# Patient Record
Sex: MALE | Race: BLACK OR AFRICAN AMERICAN | NOT HISPANIC OR LATINO | Employment: FULL TIME | URBAN - METROPOLITAN AREA
[De-identification: names, ages, dates, MRNs, and addresses within clinical notes are randomized per-mention and may not be internally consistent; named-entity substitution may affect disease eponyms.]

---

## 2018-04-23 ENCOUNTER — HISTORICAL (OUTPATIENT)
Dept: LAB | Facility: HOSPITAL | Age: 58
End: 2018-04-23

## 2018-04-24 LAB — GRAM STN SPEC: NORMAL

## 2018-04-26 LAB — FINAL CULTURE: NORMAL

## 2023-06-06 ENCOUNTER — HOSPITAL ENCOUNTER (EMERGENCY)
Facility: HOSPITAL | Age: 63
Discharge: HOME | End: 2023-06-07
Attending: EMERGENCY MEDICINE
Payer: COMMERCIAL

## 2023-06-06 VITALS
OXYGEN SATURATION: 98 % | HEART RATE: 94 BPM | BODY MASS INDEX: 35.21 KG/M2 | DIASTOLIC BLOOD PRESSURE: 73 MMHG | SYSTOLIC BLOOD PRESSURE: 125 MMHG | HEIGHT: 72 IN | RESPIRATION RATE: 16 BRPM | WEIGHT: 260 LBS | TEMPERATURE: 98 F

## 2023-06-06 DIAGNOSIS — N28.9 KIDNEY DISEASE: ICD-10-CM

## 2023-06-06 DIAGNOSIS — R53.1 WEAKNESS: Primary | ICD-10-CM

## 2023-06-06 LAB
ALBUMIN SERPL-MCNC: 4.2 G/DL (ref 3.4–4.8)
ALBUMIN/GLOB SERPL: 1.1 RATIO (ref 1.1–2)
ALP SERPL-CCNC: 38 UNIT/L (ref 40–150)
ALT SERPL-CCNC: 22 UNIT/L (ref 0–55)
AST SERPL-CCNC: 23 UNIT/L (ref 5–34)
BASOPHILS # BLD AUTO: 0.04 X10(3)/MCL
BASOPHILS NFR BLD AUTO: 0.7 %
BILIRUBIN DIRECT+TOT PNL SERPL-MCNC: 0.7 MG/DL
BNP BLD-MCNC: <10 PG/ML
BUN SERPL-MCNC: 23.5 MG/DL (ref 8.4–25.7)
CALCIUM SERPL-MCNC: 9.9 MG/DL (ref 8.8–10)
CHLORIDE SERPL-SCNC: 103 MMOL/L (ref 98–107)
CO2 SERPL-SCNC: 26 MMOL/L (ref 23–31)
CREAT SERPL-MCNC: 2.42 MG/DL (ref 0.73–1.18)
EOSINOPHIL # BLD AUTO: 0.17 X10(3)/MCL (ref 0–0.9)
EOSINOPHIL NFR BLD AUTO: 3.1 %
ERYTHROCYTE [DISTWIDTH] IN BLOOD BY AUTOMATED COUNT: 14.2 % (ref 11.5–17)
FLUAV AG UPPER RESP QL IA.RAPID: NOT DETECTED
FLUBV AG UPPER RESP QL IA.RAPID: NOT DETECTED
GFR SERPLBLD CREATININE-BSD FMLA CKD-EPI: 30 MLS/MIN/1.73/M2
GLOBULIN SER-MCNC: 3.8 GM/DL (ref 2.4–3.5)
GLUCOSE SERPL-MCNC: 140 MG/DL (ref 82–115)
HCT VFR BLD AUTO: 42.5 % (ref 42–52)
HGB BLD-MCNC: 13.8 G/DL (ref 14–18)
IMM GRANULOCYTES # BLD AUTO: 0.01 X10(3)/MCL (ref 0–0.04)
IMM GRANULOCYTES NFR BLD AUTO: 0.2 %
LYMPHOCYTES # BLD AUTO: 2.08 X10(3)/MCL (ref 0.6–4.6)
LYMPHOCYTES NFR BLD AUTO: 37.7 %
MCH RBC QN AUTO: 29.2 PG (ref 27–31)
MCHC RBC AUTO-ENTMCNC: 32.5 G/DL (ref 33–36)
MCV RBC AUTO: 90 FL (ref 80–94)
MONOCYTES # BLD AUTO: 0.52 X10(3)/MCL (ref 0.1–1.3)
MONOCYTES NFR BLD AUTO: 9.4 %
NEUTROPHILS # BLD AUTO: 2.7 X10(3)/MCL (ref 2.1–9.2)
NEUTROPHILS NFR BLD AUTO: 48.9 %
NRBC BLD AUTO-RTO: 0 %
PLATELET # BLD AUTO: 207 X10(3)/MCL (ref 130–400)
PMV BLD AUTO: 9.2 FL (ref 7.4–10.4)
POTASSIUM SERPL-SCNC: 3.9 MMOL/L (ref 3.5–5.1)
PROT SERPL-MCNC: 8 GM/DL (ref 5.8–7.6)
RBC # BLD AUTO: 4.72 X10(6)/MCL (ref 4.7–6.1)
SARS-COV-2 RNA RESP QL NAA+PROBE: NOT DETECTED
SODIUM SERPL-SCNC: 142 MMOL/L (ref 136–145)
TROPONIN I SERPL-MCNC: <0.01 NG/ML (ref 0–0.04)
TSH SERPL-ACNC: 5.11 UIU/ML (ref 0.35–4.94)
WBC # SPEC AUTO: 5.52 X10(3)/MCL (ref 4.5–11.5)

## 2023-06-06 PROCEDURE — 83880 ASSAY OF NATRIURETIC PEPTIDE: CPT | Performed by: EMERGENCY MEDICINE

## 2023-06-06 PROCEDURE — 80053 COMPREHEN METABOLIC PANEL: CPT | Performed by: EMERGENCY MEDICINE

## 2023-06-06 PROCEDURE — 85025 COMPLETE CBC W/AUTO DIFF WBC: CPT | Performed by: EMERGENCY MEDICINE

## 2023-06-06 PROCEDURE — 0240U COVID/FLU A&B PCR: CPT | Performed by: EMERGENCY MEDICINE

## 2023-06-06 PROCEDURE — 96360 HYDRATION IV INFUSION INIT: CPT

## 2023-06-06 PROCEDURE — 25000003 PHARM REV CODE 250: Performed by: EMERGENCY MEDICINE

## 2023-06-06 PROCEDURE — 84443 ASSAY THYROID STIM HORMONE: CPT | Performed by: EMERGENCY MEDICINE

## 2023-06-06 PROCEDURE — 84484 ASSAY OF TROPONIN QUANT: CPT | Performed by: EMERGENCY MEDICINE

## 2023-06-06 PROCEDURE — 99284 EMERGENCY DEPT VISIT MOD MDM: CPT | Mod: 25

## 2023-06-06 RX ORDER — SODIUM CHLORIDE 9 MG/ML
1000 INJECTION, SOLUTION INTRAVENOUS
Status: COMPLETED | OUTPATIENT
Start: 2023-06-06 | End: 2023-06-06

## 2023-06-06 RX ADMIN — SODIUM CHLORIDE 1000 ML: 9 INJECTION, SOLUTION INTRAVENOUS at 10:06

## 2023-06-07 LAB
APPEARANCE UR: CLEAR
BACTERIA #/AREA URNS AUTO: ABNORMAL /HPF
BILIRUB UR QL STRIP.AUTO: NEGATIVE MG/DL
COLOR UR: YELLOW
GLUCOSE UR QL STRIP.AUTO: >=1000 MG/DL
KETONES UR QL STRIP.AUTO: NEGATIVE MG/DL
LEUKOCYTE ESTERASE UR QL STRIP.AUTO: NEGATIVE UNIT/L
MUCOUS THREADS URNS QL MICRO: ABNORMAL /LPF
NITRITE UR QL STRIP.AUTO: NEGATIVE
PH UR STRIP.AUTO: 5.5 [PH]
PROT UR QL STRIP.AUTO: 30 MG/DL
RBC #/AREA URNS AUTO: ABNORMAL /HPF
RBC UR QL AUTO: ABNORMAL UNIT/L
SP GR UR STRIP.AUTO: 1.02
SQUAMOUS #/AREA URNS AUTO: ABNORMAL /HPF
UROBILINOGEN UR STRIP-ACNC: 0.2 MG/DL
WBC #/AREA URNS AUTO: ABNORMAL /HPF

## 2023-06-07 PROCEDURE — 81001 URINALYSIS AUTO W/SCOPE: CPT | Performed by: EMERGENCY MEDICINE

## 2023-06-07 NOTE — ED PROVIDER NOTES
Encounter Date: 6/6/2023       History     Chief Complaint   Patient presents with    Weakness    Shortness of Breath    Neck Pain     62-year-old male with a past medical history of diabetes and hypertension complains of generalized weakness for the last 2 days.  He feels a little short of breath in his having pain in his upper shoulders and neck.  He states that he is a , is not in the heat, has had no recent medication changes. However he does not think he is drinking enough fluids He has no chest pain or abdominal pain.      Review of patient's allergies indicates:  No Known Allergies  Past Medical History:   Diagnosis Date    Diabetes mellitus     Hypertension      History reviewed. No pertinent surgical history.  No family history on file.     Review of Systems   Musculoskeletal:  Positive for arthralgias and neck pain.   Neurological:  Positive for weakness.   All other systems reviewed and are negative.    Physical Exam     Initial Vitals [06/06/23 2228]   BP Pulse Resp Temp SpO2   (!) 87/56 94 18 98.1 °F (36.7 °C) 98 %      MAP       --         Physical Exam    Nursing note and vitals reviewed.  Constitutional: Vital signs are normal. He appears well-developed and well-nourished.   HENT:   Head: Normocephalic and atraumatic.   Mouth/Throat: Oropharynx is clear and moist.   Eyes: Pupils are equal, round, and reactive to light.   Neck: Neck supple.   Normal range of motion.  Cardiovascular:  Normal rate, regular rhythm and normal heart sounds.           Pulmonary/Chest: Breath sounds normal. No respiratory distress.   Abdominal: Abdomen is soft. There is no abdominal tenderness.   Musculoskeletal:         General: No tenderness or edema.      Cervical back: Normal range of motion and neck supple. No edema or erythema.     Lymphadenopathy:     He has no cervical adenopathy.   Neurological: He is alert and oriented to person, place, and time. GCS score is 15. GCS eye subscore is 4. GCS verbal  subscore is 5. GCS motor subscore is 6.   Skin: Skin is warm and dry. Capillary refill takes less than 2 seconds.       ED Course   Procedures  Labs Reviewed   CBC WITH DIFFERENTIAL - Abnormal; Notable for the following components:       Result Value    Hgb 13.8 (*)     MCHC 32.5 (*)     All other components within normal limits   COMPREHENSIVE METABOLIC PANEL - Abnormal; Notable for the following components:    Glucose Level 140 (*)     Creatinine 2.42 (*)     Protein Total 8.0 (*)     Globulin 3.8 (*)     Alkaline Phosphatase 38 (*)     All other components within normal limits   URINALYSIS, REFLEX TO URINE CULTURE - Abnormal; Notable for the following components:    Protein, UA 30 (*)     Glucose, UA >=1000 (*)     Blood, UA Trace (*)     All other components within normal limits   TSH - Abnormal; Notable for the following components:    Thyroid Stimulating Hormone 5.105 (*)     All other components within normal limits   URINALYSIS, MICROSCOPIC - Abnormal; Notable for the following components:    Mucous, UA Small (*)     Squamous Epithelial Cells, UA Few (*)     All other components within normal limits   B-TYPE NATRIURETIC PEPTIDE - Normal   TROPONIN I - Normal   COVID/FLU A&B PCR - Normal    Narrative:     The Xpert Xpress SARS-CoV-2/FLU/RSV plus is a rapid, multiplexed real-time PCR test intended for the simultaneous qualitative detection and differentiation of SARS-CoV-2, Influenza A, Influenza B, and respiratory syncytial virus (RSV) viral RNA in either nasopharyngeal swab or nasal swab specimens.                Imaging Results              X-Ray Chest 1 View (In process)                   X-Rays:   Independently Interpreted Readings:   Other Readings:  Chest Xray is negative by my preliminary reading  Medications   0.9%  NaCl infusion (0 mLs Intravenous Stopped 6/6/23 1471)     Medical Decision Making:   Initial Assessment:   62-year-old male with a past medical history of diabetes and hypertension  complains of generalized weakness for the last 2 days.  He feels a little short of breath in his having pain in his upper shoulders and neck.  He states that he is a , is not in the heat, has had no recent medication changes.  He has no chest pain or abdominal pain.      Differential Diagnosis:   Differential diagnosis includes but is not limited to dehydration, renal failure, electrolyte abnormality, infectious process  Clinical Tests:   Lab Tests: Reviewed  Radiological Study: Reviewed  ED Management:  Patient was seen evaluated in the emergency department with history physical exam, lab work.  Chest x-ray and EKG was performed.  He was given IV fluids.  His blood pressure increased in at discharge he is feeling well.  Workup is benign other than elevated creatinine level which he will discuss with his doctor next week.  He also has mildly increased TSH.  He is stable for discharge and questions were answered.           ED Course as of 06/07/23 0118   Wed Jun 07, 2023   0030 Heart rate jumped to 122 in the alarm started alarming.  I went in the room and the patient was jogging in place, laughing, and states he feels well in his asking to go home.  UA is pending.  He was given IV fluids.  Elevated Creatinine was discussed [SH]   0046 Creatinine(!): 2.42 [SH]   0047 CKD vs dehydration [SH]   0047 Thyroid Stimulating Hormone(!): 5.105 [SH]   0103 I recommend that he drink lots of fluids and keep his appointment with his doctor next week.  He may need to have his lisinopril discontinued but at this time I recommend he increase his fluid intake and his doctor can evaluate his home medications next week. [SH]      ED Course User Index  [SH] Meli Arzate MD                 Clinical Impression:   Final diagnoses:  [R53.1] Weakness (Primary)  [N28.9] Kidney disease        ED Disposition Condition    Discharge Stable          ED Prescriptions    None       Follow-up Information       Follow up With  Specialties Details Why Contact Info    PCP  Schedule an appointment as soon as possible for a visit                Meli Arzate MD  06/07/23 0118

## 2023-07-30 ENCOUNTER — HOSPITAL ENCOUNTER (EMERGENCY)
Facility: HOSPITAL | Age: 63
Discharge: HOME | End: 2023-07-30
Attending: INTERNAL MEDICINE
Payer: COMMERCIAL

## 2023-07-30 VITALS
TEMPERATURE: 99 F | RESPIRATION RATE: 16 BRPM | DIASTOLIC BLOOD PRESSURE: 72 MMHG | OXYGEN SATURATION: 98 % | HEART RATE: 84 BPM | SYSTOLIC BLOOD PRESSURE: 158 MMHG

## 2023-07-30 DIAGNOSIS — S81.012A KNEE LACERATION, LEFT, INITIAL ENCOUNTER: Primary | ICD-10-CM

## 2023-07-30 DIAGNOSIS — T14.90XA TRAUMA: ICD-10-CM

## 2023-07-30 PROCEDURE — 25000003 PHARM REV CODE 250: Performed by: NURSE PRACTITIONER

## 2023-07-30 PROCEDURE — 90715 TDAP VACCINE 7 YRS/> IM: CPT | Performed by: INTERNAL MEDICINE

## 2023-07-30 PROCEDURE — 90471 IMMUNIZATION ADMIN: CPT | Performed by: INTERNAL MEDICINE

## 2023-07-30 PROCEDURE — 96372 THER/PROPH/DIAG INJ SC/IM: CPT | Performed by: INTERNAL MEDICINE

## 2023-07-30 PROCEDURE — 25000003 PHARM REV CODE 250: Performed by: INTERNAL MEDICINE

## 2023-07-30 PROCEDURE — 12034 INTMD RPR S/TR/EXT 7.6-12.5: CPT

## 2023-07-30 PROCEDURE — 63600175 PHARM REV CODE 636 W HCPCS: Performed by: INTERNAL MEDICINE

## 2023-07-30 PROCEDURE — 99284 EMERGENCY DEPT VISIT MOD MDM: CPT

## 2023-07-30 RX ORDER — LIDOCAINE HYDROCHLORIDE 10 MG/ML
20 INJECTION INFILTRATION; PERINEURAL
Status: COMPLETED | OUTPATIENT
Start: 2023-07-30 | End: 2023-07-30

## 2023-07-30 RX ORDER — CEFAZOLIN SODIUM 1 G/3ML
1 INJECTION, POWDER, FOR SOLUTION INTRAMUSCULAR; INTRAVENOUS
Status: COMPLETED | OUTPATIENT
Start: 2023-07-30 | End: 2023-07-30

## 2023-07-30 RX ORDER — DICLOFENAC SODIUM 75 MG/1
75 TABLET, DELAYED RELEASE ORAL 2 TIMES DAILY
Qty: 14 TABLET | Refills: 0 | Status: SHIPPED | OUTPATIENT
Start: 2023-07-30 | End: 2023-08-06

## 2023-07-30 RX ORDER — HYDROCODONE BITARTRATE AND ACETAMINOPHEN 5; 325 MG/1; MG/1
1 TABLET ORAL EVERY 8 HOURS PRN
Qty: 15 TABLET | Refills: 0 | Status: SHIPPED | OUTPATIENT
Start: 2023-07-30 | End: 2023-08-04

## 2023-07-30 RX ORDER — AMOXICILLIN AND CLAVULANATE POTASSIUM 875; 125 MG/1; MG/1
1 TABLET, FILM COATED ORAL 2 TIMES DAILY
Qty: 14 TABLET | Refills: 0 | Status: SHIPPED | OUTPATIENT
Start: 2023-07-30

## 2023-07-30 RX ADMIN — BACITRACIN ZINC, NEOMYCIN, POLYMYXIN B 1 EACH: 400; 3.5; 5 OINTMENT TOPICAL at 08:07

## 2023-07-30 RX ADMIN — LIDOCAINE HYDROCHLORIDE 20 ML: 10 INJECTION, SOLUTION INFILTRATION; PERINEURAL at 07:07

## 2023-07-30 RX ADMIN — TETANUS TOXOID, REDUCED DIPHTHERIA TOXOID AND ACELLULAR PERTUSSIS VACCINE, ADSORBED 0.5 ML: 5; 2.5; 8; 8; 2.5 SUSPENSION INTRAMUSCULAR at 07:07

## 2023-07-30 RX ADMIN — CEFAZOLIN 1 G: 330 INJECTION, POWDER, FOR SOLUTION INTRAMUSCULAR; INTRAVENOUS at 07:07

## 2023-07-30 NOTE — ED PROVIDER NOTES
Encounter Date: 7/30/2023       History     Chief Complaint   Patient presents with    Laceration     Pt presents with laceration to his left knee after a chainsaw accident while cutting up a tree. Bleeding controlled at this time.      Presents with a laceration to his left knee prior to arrival. States he was cutting a tree with a chain saw and hit his leg. Hx of DM and HTN, unsure of last tetanus immunization. No active bleeding. Pt reports finishing another hour of cutting prior to coming to the ED. Denies coolness to affected extremity or loss of sensation to extremity.     The history is provided by the patient.     Review of patient's allergies indicates:  No Known Allergies  Past Medical History:   Diagnosis Date    Diabetes mellitus     Hypertension      History reviewed. No pertinent surgical history.  History reviewed. No pertinent family history.     Review of Systems   Constitutional:  Negative for chills, diaphoresis, fatigue and fever.   HENT:  Negative for facial swelling, postnasal drip, rhinorrhea, sinus pressure, sinus pain, sore throat and trouble swallowing.    Respiratory:  Negative for cough, chest tightness, shortness of breath and wheezing.    Cardiovascular:  Negative for chest pain, palpitations and leg swelling.   Gastrointestinal:  Negative for abdominal pain, diarrhea, nausea and vomiting.   Genitourinary:  Negative for dysuria, flank pain, hematuria and urgency.   Musculoskeletal:  Negative for arthralgias, back pain and myalgias.   Skin:  Positive for wound. Negative for color change and rash.   Neurological:  Negative for dizziness, syncope, weakness and headaches.   Hematological:  Does not bruise/bleed easily.   All other systems reviewed and are negative.      Physical Exam     Initial Vitals [07/30/23 1843]   BP Pulse Resp Temp SpO2   (!) 177/75 100 18 98.4 °F (36.9 °C) 100 %      MAP       --         Physical Exam    Nursing note and vitals reviewed.  Constitutional: He appears  well-developed and well-nourished. No distress.   HENT:   Head: Normocephalic and atraumatic.   Eyes: Conjunctivae are normal. Pupils are equal, round, and reactive to light.   Neck: No JVD present.   Normal range of motion.  Cardiovascular:  Normal rate, regular rhythm, normal heart sounds and intact distal pulses.           Pulmonary/Chest: Breath sounds normal. No respiratory distress.   Musculoskeletal:         General: No edema. Normal range of motion.      Cervical back: Normal range of motion.      Comments: 8 cm laceration to the superior aspect of the left knee with associated secondary superficial lacerations. N-V intact     Neurological: He is alert and oriented to person, place, and time. He has normal strength. GCS score is 15. GCS eye subscore is 4. GCS verbal subscore is 5. GCS motor subscore is 6.   Skin: Skin is warm. No rash noted.   8 cm laceration to the superior aspect of the left knee with associated abrasions. N-V intact   Psychiatric: His behavior is normal.         ED Course   Lac Repair    Date/Time: 7/30/2023 8:49 PM    Performed by: ANNE MARIE Hartley Jr.  Authorized by: Sammy Blount MD    Consent:     Consent obtained:  Emergent situation    Consent given by:  Patient    Risks, benefits, and alternatives were discussed: yes      Risks discussed:  Pain, poor cosmetic result and poor wound healing    Alternatives discussed:  No treatment  Universal protocol:     Procedure explained and questions answered to patient or proxy's satisfaction: yes      Imaging studies available: yes      Immediately prior to procedure, a time out was called: yes      Patient identity confirmed:  Verbally with patient, arm band and provided demographic data  Anesthesia:     Anesthesia method:  Local infiltration    Local anesthetic:  Lidocaine 1% w/o epi  Laceration details:     Location:  Leg    Leg location:  L knee    Length (cm):  8 (multiple superficial lacerations that deepened during  suture application)  Pre-procedure details:     Preparation:  Patient was prepped and draped in usual sterile fashion and imaging obtained to evaluate for foreign bodies  Exploration:     Imaging obtained: x-ray      Imaging outcome: foreign body not noted      Wound exploration: entire depth of wound visualized      Contaminated: yes    Treatment:     Area cleansed with:  Povidone-iodine and saline    Amount of cleaning:  Extensive    Irrigation solution:  Sterile saline    Irrigation volume:  500 ml    Irrigation method:  Pressure wash    Layers/structures repaired:  Deep subcutaneous  Deep subcutaneous:     Suture size:  3-0    Suture material:  Vicryl    Suture technique:  Horizontal mattress    Number of sutures:  4  Skin repair:     Repair method:  Sutures    Suture size:  3-0    Suture material:  Nylon    Suture technique:  Simple interrupted and figure eight    Number of sutures:  35  Approximation:     Approximation:  Close  Repair type:     Repair type:  Simple  Post-procedure details:     Dressing:  Antibiotic ointment and non-adherent dressing    Procedure completion:  Tolerated    Labs Reviewed - No data to display       Imaging Results              X-Ray Knee 1 or 2 View Left (Preliminary result)  Result time 07/30/23 20:53:35   Procedure changed from X-Ray Knee 3 View Left     Wet Read by Franklin Childs Jr., THORP (07/30/23 20:53:35, [facility], Emergency Medicine)    Degenerative changes. No acute fracture noted.                                     Medications   LIDOcaine HCL 10 mg/ml (1%) injection 20 mL (20 mLs Infiltration Given 7/30/23 1910)   Tdap (BOOSTRIX) vaccine injection 0.5 mL (0.5 mLs Intramuscular Given 7/30/23 1907)   ceFAZolin injection 1 g (1 g Intramuscular Given 7/30/23 1907)   neomycin-bacitracnZn-polymyxnB packet (1 each Topical (Top) Given 7/30/23 2055)     Medical Decision Making:   Differential Diagnosis:   Laceration, fracture, among others  Clinical  Tests:   Radiological Study: Reviewed and Ordered  ED Management:  8:53 PM Reassessed patient at this time. Reports condition has improved. Discussed with patient all pertinent ED information and results. Discussed diagnosis and treatment plan with patient. Follow up instructions and return to ED instruction have been given. All questions and concerns were addressed at this time. Patient voices understanding of information and instructions. Patient is comfortable with plan and discharge. Patient is stable for discharge.                             Clinical Impression:   Final diagnoses:  [T14.90XA] Trauma  [S81.012A] Knee laceration, left, initial encounter (Primary)        ED Disposition Condition    Discharge Stable          ED Prescriptions       Medication Sig Dispense Start Date End Date Auth. Provider    amoxicillin-clavulanate 875-125mg (AUGMENTIN) 875-125 mg per tablet Take 1 tablet by mouth 2 (two) times daily. 14 tablet 7/30/2023 -- ANNE MARIE Hartley Jr.    diclofenac (VOLTAREN) 75 MG EC tablet Take 1 tablet (75 mg total) by mouth 2 (two) times daily. for 7 days 14 tablet 7/30/2023 8/6/2023 ANNE MARIE Hartley Jr.    HYDROcodone-acetaminophen (NORCO) 5-325 mg per tablet Take 1 tablet by mouth every 8 (eight) hours as needed for Pain. 15 tablet 7/30/2023 8/4/2023 ANNE MARIE Hartley Jr.          Follow-up Information       Follow up With Specialties Details Why Contact Info    [facility] Emergency Medicine In 3 days As needed, If symptoms worsen [address] [facility] [address] [phone]             ANNE MARIE Hartley Jr.  07/30/23 9079

## 2023-07-30 NOTE — Clinical Note
"Ricardo"Duglas Castro was seen and treated in our emergency department on 7/30/2023.  He may return to work on 08/02/2023.       If you have any questions or concerns, please don't hesitate to call.      Franklin Childs Jr., FNP"

## 2023-07-31 NOTE — DISCHARGE INSTRUCTIONS
Clean wound twice daily and apply neosporin.   Return to the [facility] ED immediately for worsening redness, purulent drainage from wound, or fever.   Follow up with your primary care physician in 3-5 days for follow up evaluation.  Take medication as prescribed.  Return to the [facility] ED in 10-14 days for suture removal.

## 2023-08-08 ENCOUNTER — HOSPITAL ENCOUNTER (EMERGENCY)
Facility: HOSPITAL | Age: 63
Discharge: HOME | End: 2023-08-08
Attending: INTERNAL MEDICINE
Payer: COMMERCIAL

## 2023-08-08 VITALS
WEIGHT: 257.94 LBS | BODY MASS INDEX: 36.11 KG/M2 | HEART RATE: 98 BPM | HEIGHT: 71 IN | DIASTOLIC BLOOD PRESSURE: 64 MMHG | TEMPERATURE: 98 F | OXYGEN SATURATION: 100 % | SYSTOLIC BLOOD PRESSURE: 152 MMHG | RESPIRATION RATE: 16 BRPM

## 2023-08-08 DIAGNOSIS — Z48.02 VISIT FOR SUTURE REMOVAL: Primary | ICD-10-CM

## 2023-08-08 PROCEDURE — 99281 EMR DPT VST MAYX REQ PHY/QHP: CPT

## 2023-08-08 NOTE — ED PROVIDER NOTES
Encounter Date: 8/8/2023       History     Chief Complaint   Patient presents with    Suture / Staple Removal     Pt here for wound check. No problems noted at this time.      62 year old male who presents to the emergency department for suture removal.   Sutures were placed on 7/30/23  after he cut his leg with a chainsaw.  He states it is healing well and has no complaints.  Patient denies fever, chills, drainage, swelling.      The history is provided by the patient. No  was used.     Review of patient's allergies indicates:  No Known Allergies  Past Medical History:   Diagnosis Date    Diabetes mellitus     Hypertension      No past surgical history on file.  No family history on file.     Review of Systems   Constitutional:  Negative for chills and fever.   Respiratory:  Negative for shortness of breath.    Cardiovascular:  Negative for chest pain and leg swelling.   Gastrointestinal:  Negative for nausea and vomiting.   Musculoskeletal:  Negative for back pain and myalgias.   Skin:  Negative for rash.   Neurological:  Negative for dizziness and headaches.       Physical Exam     Initial Vitals [08/08/23 0746]   BP Pulse Resp Temp SpO2   (!) 160/80 98 16 98.2 °F (36.8 °C) 100 %      MAP       --         Physical Exam    Nursing note and vitals reviewed.  Constitutional: He appears well-developed and well-nourished.   HENT:   Nose: Nose normal.   Mouth/Throat: Oropharynx is clear and moist.   Eyes: Conjunctivae are normal.   Cardiovascular:  Normal rate, regular rhythm, normal heart sounds and intact distal pulses.           Pulmonary/Chest: Breath sounds normal.   Abdominal: Abdomen is soft. Bowel sounds are normal. There is no abdominal tenderness.   Musculoskeletal:         General: Normal range of motion.     Neurological: He is alert. GCS eye subscore is 4. GCS verbal subscore is 5. GCS motor subscore is 6.   Skin: Skin is warm. Capillary refill takes less than 2 seconds.   Leg  laceration healing well.  No drainage, no swelling, no erythema.           ED Course   Procedures  Labs Reviewed - No data to display       Imaging Results    None          Medications - No data to display  Medical Decision Making:   Initial Assessment:   62 year old male who presents to the emergency department for suture removal.   Sutures were placed on 7/30/23  after he cut his leg with a chainsaw.  He states it is healing well and has no complaints.    Differential Diagnosis:   Wound evaluation  Suture removal  ED Management:  Sutures were removed without complications.  Area was clean and steri strips applied for extra reinforcement.  Advised patient to leave in place for 2-3 days.       The patient is resting comfortably and in no acute distress.   Gave strict ED precautions and specific conditions for return to the emergency department and importance of follow up with pcp.  Patient voices understanding and agrees to the plan discussed. All patients' questions have been answered at this time. He has remained hemodynamically stable throughout entire stay in ED and is stable for discharge home.                           Clinical Impression:   Final diagnoses:  [Z48.02] Visit for suture removal (Primary)        ED Disposition Condition    Discharge Stable          ED Prescriptions    None       Follow-up Information       Follow up With Specialties Details Why Contact Info    [facility] Emergency Medicine  As needed, If symptoms worsen [address] [facility] [address] [phone]             Sameera Hernadez PA  08/08/23 5384

## 2023-08-08 NOTE — DISCHARGE INSTRUCTIONS
Clean wound with soap and water.   Return with any concerning symptoms.  Follow up with your primary care provider within 2-3 days.

## 2024-02-15 RX ORDER — LISINOPRIL 40 MG/1
40 TABLET ORAL DAILY
COMMUNITY

## 2024-02-15 RX ORDER — TIRZEPATIDE 7.5 MG/.5ML
7.5 INJECTION, SOLUTION SUBCUTANEOUS
COMMUNITY

## 2024-02-16 ENCOUNTER — HOSPITAL ENCOUNTER (OUTPATIENT)
Dept: RADIOLOGY | Facility: HOSPITAL | Age: 64
Discharge: HOME | End: 2024-02-16
Attending: NURSE PRACTITIONER
Payer: COMMERCIAL

## 2024-02-16 DIAGNOSIS — Z01.818 OTHER SPECIFIED PRE-OPERATIVE EXAMINATION: Primary | ICD-10-CM

## 2024-02-16 DIAGNOSIS — Z01.818 OTHER SPECIFIED PRE-OPERATIVE EXAMINATION: ICD-10-CM

## 2024-02-16 PROCEDURE — 71046 X-RAY EXAM CHEST 2 VIEWS: CPT | Mod: TC

## 2024-02-20 ENCOUNTER — ANESTHESIA EVENT (OUTPATIENT)
Dept: SURGERY | Facility: HOSPITAL | Age: 64
End: 2024-02-20
Payer: COMMERCIAL

## 2024-02-20 RX ORDER — SODIUM CHLORIDE 9 MG/ML
INJECTION, SOLUTION INTRAVENOUS CONTINUOUS
Status: CANCELLED | OUTPATIENT
Start: 2024-02-20

## 2024-02-21 ENCOUNTER — HOSPITAL ENCOUNTER (OUTPATIENT)
Facility: HOSPITAL | Age: 64
Discharge: HOME | End: 2024-02-21
Attending: PODIATRIST | Admitting: PODIATRIST
Payer: COMMERCIAL

## 2024-02-21 ENCOUNTER — ANESTHESIA (OUTPATIENT)
Dept: SURGERY | Facility: HOSPITAL | Age: 64
End: 2024-02-21
Payer: COMMERCIAL

## 2024-02-21 DIAGNOSIS — Z01.818 PREOPERATIVE EXAMINATION, UNSPECIFIED: Primary | ICD-10-CM

## 2024-02-21 DIAGNOSIS — L03.031 ABSCESS OF FIFTH TOENAIL OF RIGHT FOOT: ICD-10-CM

## 2024-02-21 DIAGNOSIS — L97.501 NEUROPATHIC ULCER OF FOOT, UNSPECIFIED LATERALITY, LIMITED TO BREAKDOWN OF SKIN: ICD-10-CM

## 2024-02-21 LAB — POCT GLUCOSE: 95 MG/DL (ref 70–110)

## 2024-02-21 PROCEDURE — 63600175 PHARM REV CODE 636 W HCPCS: Performed by: PODIATRIST

## 2024-02-21 PROCEDURE — 63600175 PHARM REV CODE 636 W HCPCS: Performed by: NURSE ANESTHETIST, CERTIFIED REGISTERED

## 2024-02-21 PROCEDURE — 82962 GLUCOSE BLOOD TEST: CPT | Performed by: PODIATRIST

## 2024-02-21 PROCEDURE — 87205 SMEAR GRAM STAIN: CPT | Performed by: PODIATRIST

## 2024-02-21 PROCEDURE — 36000707: Performed by: PODIATRIST

## 2024-02-21 PROCEDURE — 87070 CULTURE OTHR SPECIMN AEROBIC: CPT | Performed by: PODIATRIST

## 2024-02-21 PROCEDURE — 25000003 PHARM REV CODE 250: Performed by: PODIATRIST

## 2024-02-21 PROCEDURE — 87116 MYCOBACTERIA CULTURE: CPT | Performed by: PODIATRIST

## 2024-02-21 PROCEDURE — 37000008 HC ANESTHESIA 1ST 15 MINUTES: Performed by: PODIATRIST

## 2024-02-21 PROCEDURE — 63600175 PHARM REV CODE 636 W HCPCS

## 2024-02-21 PROCEDURE — 87102 FUNGUS ISOLATION CULTURE: CPT | Performed by: PODIATRIST

## 2024-02-21 PROCEDURE — 63600175 PHARM REV CODE 636 W HCPCS: Mod: JZ,JG | Performed by: PODIATRIST

## 2024-02-21 PROCEDURE — 71000015 HC POSTOP RECOV 1ST HR: Performed by: PODIATRIST

## 2024-02-21 PROCEDURE — 36000706: Performed by: PODIATRIST

## 2024-02-21 PROCEDURE — 37000009 HC ANESTHESIA EA ADD 15 MINS: Performed by: PODIATRIST

## 2024-02-21 PROCEDURE — D9220A PRA ANESTHESIA: Mod: ,,, | Performed by: NURSE ANESTHETIST, CERTIFIED REGISTERED

## 2024-02-21 PROCEDURE — 63600175 PHARM REV CODE 636 W HCPCS: Performed by: ANESTHESIOLOGY

## 2024-02-21 PROCEDURE — 87075 CULTR BACTERIA EXCEPT BLOOD: CPT | Performed by: PODIATRIST

## 2024-02-21 RX ORDER — LIDOCAINE HYDROCHLORIDE 10 MG/ML
INJECTION INFILTRATION; PERINEURAL
Status: DISCONTINUED | OUTPATIENT
Start: 2024-02-21 | End: 2024-02-21 | Stop reason: HOSPADM

## 2024-02-21 RX ORDER — MIDAZOLAM HYDROCHLORIDE 1 MG/ML
INJECTION INTRAMUSCULAR; INTRAVENOUS
Status: COMPLETED
Start: 2024-02-21 | End: 2024-02-21

## 2024-02-21 RX ORDER — CEFAZOLIN SODIUM 1 G/3ML
2 INJECTION, POWDER, FOR SOLUTION INTRAMUSCULAR; INTRAVENOUS
Status: COMPLETED | OUTPATIENT
Start: 2024-02-21 | End: 2024-02-21

## 2024-02-21 RX ORDER — ACETAMINOPHEN 10 MG/ML
INJECTION, SOLUTION INTRAVENOUS
Status: DISCONTINUED | OUTPATIENT
Start: 2024-02-21 | End: 2024-02-21

## 2024-02-21 RX ORDER — HYDROMORPHONE HYDROCHLORIDE 2 MG/ML
0.4 INJECTION, SOLUTION INTRAMUSCULAR; INTRAVENOUS; SUBCUTANEOUS EVERY 5 MIN PRN
Status: DISCONTINUED | OUTPATIENT
Start: 2024-02-21 | End: 2024-02-21

## 2024-02-21 RX ORDER — SODIUM CHLORIDE, SODIUM LACTATE, POTASSIUM CHLORIDE, CALCIUM CHLORIDE 600; 310; 30; 20 MG/100ML; MG/100ML; MG/100ML; MG/100ML
INJECTION, SOLUTION INTRAVENOUS CONTINUOUS
Status: DISCONTINUED | OUTPATIENT
Start: 2024-02-21 | End: 2024-02-21 | Stop reason: HOSPADM

## 2024-02-21 RX ORDER — BUPIVACAINE HYDROCHLORIDE 5 MG/ML
INJECTION, SOLUTION EPIDURAL; INTRACAUDAL
Status: DISCONTINUED | OUTPATIENT
Start: 2024-02-21 | End: 2024-02-21 | Stop reason: HOSPADM

## 2024-02-21 RX ORDER — MIDAZOLAM HYDROCHLORIDE 1 MG/ML
2 INJECTION INTRAMUSCULAR; INTRAVENOUS ONCE
Status: COMPLETED | OUTPATIENT
Start: 2024-02-21 | End: 2024-02-21

## 2024-02-21 RX ORDER — LIDOCAINE HYDROCHLORIDE 10 MG/ML
INJECTION INFILTRATION; PERINEURAL
Status: DISCONTINUED
Start: 2024-02-21 | End: 2024-02-21 | Stop reason: HOSPADM

## 2024-02-21 RX ORDER — SODIUM CHLORIDE, SODIUM GLUCONATE, SODIUM ACETATE, POTASSIUM CHLORIDE AND MAGNESIUM CHLORIDE 30; 37; 368; 526; 502 MG/100ML; MG/100ML; MG/100ML; MG/100ML; MG/100ML
INJECTION, SOLUTION INTRAVENOUS CONTINUOUS
Status: DISCONTINUED | OUTPATIENT
Start: 2024-02-21 | End: 2024-02-21 | Stop reason: HOSPADM

## 2024-02-21 RX ORDER — HYDROCODONE BITARTRATE AND ACETAMINOPHEN 5; 325 MG/1; MG/1
1 TABLET ORAL EVERY 4 HOURS PRN
Status: DISCONTINUED | OUTPATIENT
Start: 2024-02-21 | End: 2024-02-21 | Stop reason: HOSPADM

## 2024-02-21 RX ORDER — ONDANSETRON HYDROCHLORIDE 2 MG/ML
4 INJECTION, SOLUTION INTRAVENOUS DAILY PRN
Status: DISCONTINUED | OUTPATIENT
Start: 2024-02-21 | End: 2024-02-21 | Stop reason: HOSPADM

## 2024-02-21 RX ORDER — PROPOFOL 10 MG/ML
VIAL (ML) INTRAVENOUS
Status: DISCONTINUED | OUTPATIENT
Start: 2024-02-21 | End: 2024-02-21

## 2024-02-21 RX ORDER — BUPIVACAINE HYDROCHLORIDE 5 MG/ML
INJECTION, SOLUTION EPIDURAL; INTRACAUDAL
Status: DISCONTINUED
Start: 2024-02-21 | End: 2024-02-21 | Stop reason: HOSPADM

## 2024-02-21 RX ADMIN — PROPOFOL 100 MG: 10 INJECTION, EMULSION INTRAVENOUS at 01:02

## 2024-02-21 RX ADMIN — ACETAMINOPHEN 1000 MG: 10 INJECTION, SOLUTION INTRAVENOUS at 01:02

## 2024-02-21 RX ADMIN — MIDAZOLAM HYDROCHLORIDE 2 MG: 1 INJECTION, SOLUTION INTRAMUSCULAR; INTRAVENOUS at 12:02

## 2024-02-21 RX ADMIN — CEFAZOLIN 2 G: 330 INJECTION, POWDER, FOR SOLUTION INTRAMUSCULAR; INTRAVENOUS at 12:02

## 2024-02-21 RX ADMIN — SODIUM CHLORIDE, POTASSIUM CHLORIDE, SODIUM LACTATE AND CALCIUM CHLORIDE: 600; 310; 30; 20 INJECTION, SOLUTION INTRAVENOUS at 09:02

## 2024-02-21 RX ADMIN — MIDAZOLAM HYDROCHLORIDE 2 MG: 1 INJECTION INTRAMUSCULAR; INTRAVENOUS at 12:02

## 2024-02-21 RX ADMIN — PROPOFOL 50 MCG/KG/MIN: 10 INJECTION, EMULSION INTRAVENOUS at 01:02

## 2024-02-21 RX ADMIN — PROPOFOL 75 MCG/KG/MIN: 10 INJECTION, EMULSION INTRAVENOUS at 01:02

## 2024-02-21 NOTE — OP NOTE
[facility] Surgical - Periop Services  Operative Note      Date of Procedure: 2/21/2024     Procedure: Procedure(s) (LRB):  AMPUTATION, TOE  Right 2nd (Right)     Surgeon(s) and Role:     * Amadeo Beckett DPM - Primary    Assisting Surgeon: None    Pre-Operative Diagnosis:  Right foot 2nd digit ulceration with osteomyelitis  Post-Operative Diagnosis:  Same as preoperative dx    Anesthesia: Monitor Anesthesia Care    Operative Findings (including complications, if any):  Osteomyelitis of the distal phalanx of the 2nd digit.  Pieces of bone sent for culture sensitivity and Gram stain and the rest of the toe sent for permanent pathology    Description of Technical Procedures:     Patient was met in the preoperative holding area appropriate site was marked and verified patient was then brought back to the operating room and placed in a supine position anesthesia was then administered preoperative block was given using 20 cc of one-to-one mixture of 0.5% Marcaine plain and 1% lidocaine plain.  Foot was then prepped and draped in normal aseptic fashion formal time-out was then performed right lower extremity was then elevated and exsanguinated and Esmarch was tied off.      A racket style incision was made over the level of the 2nd digit of the right foot using a 15 blade incision was made down to the level of the metatarsal care was taken to avoid neurovascular structures electrocautery was used as needed the digit was disarticulated at the level of the metatarsophalangeal joint and sent to pathology with separate around during of infected bone sent to culture and sensitivity and Gram stain.  The area was flushed with copious amounts of saline no obvious abscess or purulence noted.  Applied 2-0 Vicryl for deep closure and 3-0 nylon for skin closure.  Dressed the foot with Xeroform gauze Kerlix ABD and Ace wrap.  Tourniquet was then released and prompt hyperemia was then elevated and prepped the surgical  foot.  Patient was then returned to the recovery room with vital signs stable and neurovascular status intact to the right lower extremity.  Patient will be partial weight-bearing in postoperative shoe and follow up in outpatient clinic next week    Significant Surgical Tasks Conducted by the Assistant(s), if Applicable: n/a    Estimated Blood Loss (EBL): * No values recorded between 2/21/2024 12:56 PM and 2/21/2024  1:37 PM *           Implants: * No implants in log *    Specimens:   Specimen (24h ago, onward)       Start     Ordered    02/21/24 1320  Specimen to Pathology  RELEASE UPON ORDERING        References:    Click here for ordering Quick Tip   Question:  Release to patient  Answer:  Immediate    02/21/24 1320                            Condition: Good    Disposition: PACU - hemodynamically stable.    Attestation: I was present and scrubbed for the entire procedure.    Discharge Note    OUTCOME: Patient tolerated treatment/procedure well without complication and is now ready for discharge.    DISPOSITION: Home or Self Care    FINAL DIAGNOSIS:  Osteomyelitis of the distal phalanx of the right 2nd digit with diabetic foot ulcer    FOLLOWUP: In clinic    DISCHARGE INSTRUCTIONS:      Patient is to follow up in 1 week in outpatient clinic for evaluation and be protected weight-bearing in postoperative shoe until then.  Discharge Procedure Orders   EKG 12-lead   Standing Status: Future Number of Occurrences: 1 Standing Exp. Date: 02/16/25

## 2024-02-21 NOTE — ANESTHESIA PREPROCEDURE EVALUATION
"                                                                                                             02/21/2024  Ricardo Castro is a 63 y.o., male.  BP (!) 152/76   Pulse 93   Temp 36.4 °C (97.6 °F) (Oral)   Resp 18   Ht 6' (1.829 m)   Wt 102.5 kg (226 lb)   SpO2 100%   BMI 30.65 kg/m²     Past Medical History:   Diagnosis Date    Acquired keratoderma     Diabetes mellitus     Hypertension     Right foot ulcer, with fat layer exposed        OB History   No obstetric history on file.       Wt Readings from Last 1 Encounters:   02/21/24 0935 102.5 kg (226 lb)   02/15/24 0826 99.8 kg (220 lb)       BP Readings from Last 3 Encounters:   02/21/24 (!) 152/76   08/08/23 (!) 152/64   07/30/23 (!) 158/72       There is no problem list on file for this patient.      Past Surgical History:   Procedure Laterality Date    AMPUTATION Right     middle    left arm surgery          Social History     Socioeconomic History    Marital status: Single   Tobacco Use    Smoking status: Never    Smokeless tobacco: Current     Types: Snuff   Substance and Sexual Activity    Alcohol use: Yes    Drug use: Never    Sexual activity: Yes     Partners: Female         Chemistry        Component Value Date/Time     02/16/2024 1011    K 4.2 02/16/2024 1011    CO2 29 02/16/2024 1011    BUN 15.9 02/16/2024 1011    CREATININE 1.28 (H) 02/16/2024 1011        Component Value Date/Time    CALCIUM 9.1 02/16/2024 1011    ALKPHOS 38 (L) 06/06/2023 2248    AST 23 06/06/2023 2248    ALT 22 06/06/2023 2248    BILITOT 0.7 06/06/2023 2248    EGFRNONAA 74 (L) 04/10/2018 1635            Lab Results   Component Value Date    WBC 5.01 02/16/2024    HGB 13.0 (L) 02/16/2024    HCT 40.8 (L) 02/16/2024    MCV 90.1 02/16/2024     02/16/2024       No results for input(s): "PT", "INR", "PROTIME", "APTT" in the last 72 hours.                Pre-op Assessment          Review of Systems      Physical Exam  General: Alert and " Oriented    Airway:  Mallampati: II   Mouth Opening: Normal  TM Distance: Normal  Tongue: Normal  Neck ROM: Normal ROM    Dental:  Intact    Chest/Lungs:  Clear to auscultation, Normal Respiratory Rate    Heart:  Rate: Normal  Rhythm: Regular Rhythm        Anesthesia Plan  Type of Anesthesia, risks & benefits discussed:    Anesthesia Type: Gen Natural Airway  Intra-op Monitoring Plan: Standard ASA Monitors  Post Op Pain Control Plan: multimodal analgesia  Induction:  IV  Airway Plan: Direct  Informed Consent: Informed consent signed with the Patient and all parties understand the risks and agree with anesthesia plan.  All questions answered.   ASA Score: 3  Day of Surgery Review of History & Physical: H&P Update referred to the surgeon/provider.  Anesthesia Plan Notes: Nasal cannula vs facemask supplemental oxygenation   For patients with HASMUKH/obesity, may consider SuperNoval Nasal CPAP      Poss conversion to General LMA/JELANI discussed          Ready For Surgery From Anesthesia Perspective.     .

## 2024-02-21 NOTE — DISCHARGE INSTRUCTIONS
Patient Education       Amputation of the Toe Discharge Instructions      What care is needed at home?   Prepare your home to help with your recovery.  Do not change your dressing.  Sponge bathe only. You may take a shower when released by your doctor. No soaking in the tub until your incision is healed.  Follow your doctor's instructions on how you will get around at home. You may need a wheelchair, crutches, or walker  Be sure to wash your hands before and after touching your wound or dressing.  If you smoke, stop. Smoking lowers blood flow and can slow healing.  Place ice pack on your surgical foot for 30 minutes at a time. Do this for the next 24-48 hours off and on throughout the day. Prop your foot on pillows when lying down.  If you have diabetes, continue to monitor your blood sugar. Take your drugs as ordered.  Wear your surgical post op shoe to protect your foot until the stitches are taken out. Partial weight bear on the lower part of your foot. Always wear your surgical post op shoe when walking.  What follow-up care is needed?   Be sure to keep your follow up visits.  Your doctor may send you to rehab where you will learn to move around safely and get stronger before you go home.  If you have stitches or staples, you will need to have them taken out. Your doctor will often want to do this in 1 to 2 weeks.  Your doctor may send you to a counselor for talk therapy to help you cope with the loss of your foot or toes.  Will physical activity be limited?   You may have to limit your activity. Talk to your doctor about the right amount of activity for you.  What problems could happen?   Poor wound healing or spread of dead tissue. This might require amputation of more of your foot, toes, or leg.  Infection  Very bad pain in the remaining tissue. This is stump pain.  Painful feeling that the foot or toe is still there. This is phantom pain.  Bleeding  Nerve damage  Limp ? depending on which toe has been  removed  When do I need to call the doctor?   Signs of infection. These include a fever of 100.4°F (38°C) or higher, chills, or wound that will not heal.  Signs of wound infection. These include swelling, redness, warmth around the wound; too much pain when touched; yellowish, greenish, or bloody discharge; foul smell coming from the cut site; cut site opens up.  Trouble breathing or not able to take a deep breath  Numbness or tingling feeling in the rest of your foot, toes, or leg  Chalky white, blue, or black color in the rest of your foot, toes, or leg  Pain that you cannot control with the drugs you have been given

## 2024-02-21 NOTE — ANESTHESIA POSTPROCEDURE EVALUATION
Anesthesia Post Evaluation    Patient: Ricardo Castro    Procedure(s) Performed: Procedure(s) (LRB):  AMPUTATION, TOE  Right 2nd (Right)    Final Anesthesia Type: MAC      Patient location during evaluation: OPS  Patient participation: Yes- Able to Participate  Level of consciousness: awake and alert  Post-procedure vital signs: reviewed and stable  Pain management: adequate  Airway patency: patent    PONV status at discharge: No PONV  Anesthetic complications: no      Cardiovascular status: blood pressure returned to baseline  Respiratory status: unassisted and room air  Hydration status: euvolemic  Follow-up not needed.              Vitals Value Taken Time   /70 02/21/24 0931   Temp 36.4 °C (97.6 °F) 02/21/24 0931   Pulse 83 02/21/24 0931   Resp 18 02/21/24 0935   SpO2 100 % 02/21/24 0931         No case tracking events are documented in the log.      Pain/Anita Score: No data recorded

## 2024-02-22 LAB — GRAM STN SPEC: NORMAL

## 2024-02-23 VITALS
HEART RATE: 85 BPM | OXYGEN SATURATION: 100 % | SYSTOLIC BLOOD PRESSURE: 132 MMHG | HEIGHT: 72 IN | DIASTOLIC BLOOD PRESSURE: 72 MMHG | BODY MASS INDEX: 30.61 KG/M2 | TEMPERATURE: 98 F | RESPIRATION RATE: 18 BRPM | WEIGHT: 226 LBS

## 2024-02-23 LAB — PSYCHE PATHOLOGY RESULT: NORMAL

## 2024-02-24 LAB — BACTERIA TISS AEROBE CULT: NORMAL

## 2024-02-25 LAB — BACTERIA SPEC ANAEROBE CULT: NORMAL

## 2024-03-25 LAB — FUNGUS SPEC CULT: NORMAL

## 2024-04-06 LAB — CEFTIBUTEN ISLT MIC: NORMAL

## 2025-01-27 ENCOUNTER — ANESTHESIA EVENT (OUTPATIENT)
Facility: HOSPITAL | Age: 65
End: 2025-01-27
Payer: COMMERCIAL

## 2025-01-28 ENCOUNTER — HOSPITAL ENCOUNTER (OUTPATIENT)
Facility: HOSPITAL | Age: 65
Discharge: HOME | End: 2025-01-28
Attending: OPHTHALMOLOGY | Admitting: OPHTHALMOLOGY
Payer: COMMERCIAL

## 2025-01-28 ENCOUNTER — ANESTHESIA (OUTPATIENT)
Facility: HOSPITAL | Age: 65
End: 2025-01-28
Payer: COMMERCIAL

## 2025-01-28 VITALS
WEIGHT: 226 LBS | OXYGEN SATURATION: 95 % | BODY MASS INDEX: 30.61 KG/M2 | HEART RATE: 62 BPM | SYSTOLIC BLOOD PRESSURE: 115 MMHG | DIASTOLIC BLOOD PRESSURE: 70 MMHG | HEIGHT: 72 IN | TEMPERATURE: 98 F

## 2025-01-28 DIAGNOSIS — H26.9 CATARACT: ICD-10-CM

## 2025-01-28 LAB
POCT GLUCOSE: 145 MG/DL (ref 70–110)
POCT GLUCOSE: 157 MG/DL (ref 70–110)

## 2025-01-28 PROCEDURE — V2632 POST CHMBR INTRAOCULAR LENS: HCPCS | Performed by: OPHTHALMOLOGY

## 2025-01-28 PROCEDURE — 25000003 PHARM REV CODE 250

## 2025-01-28 PROCEDURE — 36000706: Performed by: OPHTHALMOLOGY

## 2025-01-28 PROCEDURE — D9220A PRA ANESTHESIA: Mod: ,,,

## 2025-01-28 PROCEDURE — 25000003 PHARM REV CODE 250: Performed by: OPHTHALMOLOGY

## 2025-01-28 PROCEDURE — 36000707: Performed by: OPHTHALMOLOGY

## 2025-01-28 PROCEDURE — 37000009 HC ANESTHESIA EA ADD 15 MINS: Performed by: OPHTHALMOLOGY

## 2025-01-28 PROCEDURE — 63600175 PHARM REV CODE 636 W HCPCS: Performed by: OPHTHALMOLOGY

## 2025-01-28 PROCEDURE — 27201423 OPTIME MED/SURG SUP & DEVICES STERILE SUPPLY: Performed by: OPHTHALMOLOGY

## 2025-01-28 PROCEDURE — 37000008 HC ANESTHESIA 1ST 15 MINUTES: Performed by: OPHTHALMOLOGY

## 2025-01-28 PROCEDURE — 71000015 HC POSTOP RECOV 1ST HR: Performed by: OPHTHALMOLOGY

## 2025-01-28 PROCEDURE — 63600175 PHARM REV CODE 636 W HCPCS

## 2025-01-28 DEVICE — TECNIS SIMPLICITY TECNIS EYHANCE U 23.5D
Type: IMPLANTABLE DEVICE | Site: EYE | Status: FUNCTIONAL
Brand: TECNIS SIMPLICITY

## 2025-01-28 RX ORDER — OXYCODONE AND ACETAMINOPHEN 5; 325 MG/1; MG/1
1 TABLET ORAL
OUTPATIENT
Start: 2025-01-28

## 2025-01-28 RX ORDER — PREDNISOLONE/MOXIFLOX/BROMFEN 1 %-0.5 %
1 SUSPENSION, DROPS(FINAL DOSAGE FORM)(ML) OPHTHALMIC (EYE)
Status: ACTIVE | OUTPATIENT
Start: 2025-01-28 | End: 2025-01-28

## 2025-01-28 RX ORDER — LIDOCAINE HYDROCHLORIDE 40 MG/ML
INJECTION, SOLUTION RETROBULBAR
Status: DISCONTINUED | OUTPATIENT
Start: 2025-01-28 | End: 2025-01-28 | Stop reason: HOSPADM

## 2025-01-28 RX ORDER — PHENYLEPHRINE HYDROCHLORIDE 100 MG/ML
2 SOLUTION/ DROPS OPHTHALMIC
Status: COMPLETED | OUTPATIENT
Start: 2025-01-28 | End: 2025-01-28

## 2025-01-28 RX ORDER — MIDAZOLAM HYDROCHLORIDE 1 MG/ML
INJECTION INTRAMUSCULAR; INTRAVENOUS
Status: DISCONTINUED | OUTPATIENT
Start: 2025-01-28 | End: 2025-01-28

## 2025-01-28 RX ORDER — PREDNISOLONE ACETATE 10 MG/ML
1 SUSPENSION/ DROPS OPHTHALMIC
Status: COMPLETED | OUTPATIENT
Start: 2025-01-28 | End: 2025-01-28

## 2025-01-28 RX ORDER — GLUCAGON 1 MG
1 KIT INJECTION
OUTPATIENT
Start: 2025-01-28

## 2025-01-28 RX ORDER — HYDRALAZINE HYDROCHLORIDE 20 MG/ML
10 INJECTION INTRAMUSCULAR; INTRAVENOUS ONCE
OUTPATIENT
Start: 2025-01-28 | End: 2025-01-28

## 2025-01-28 RX ORDER — BUPIVACAINE HYDROCHLORIDE 7.5 MG/ML
INJECTION, SOLUTION EPIDURAL; RETROBULBAR
Status: DISCONTINUED | OUTPATIENT
Start: 2025-01-28 | End: 2025-01-28 | Stop reason: HOSPADM

## 2025-01-28 RX ORDER — BUPIVACAINE HYDROCHLORIDE 7.5 MG/ML
0.1 INJECTION, SOLUTION EPIDURAL; RETROBULBAR
Status: COMPLETED | OUTPATIENT
Start: 2025-01-28 | End: 2025-01-28

## 2025-01-28 RX ORDER — TROPICAMIDE 10 MG/ML
2 SOLUTION/ DROPS OPHTHALMIC
Status: COMPLETED | OUTPATIENT
Start: 2025-01-28 | End: 2025-01-28

## 2025-01-28 RX ORDER — OFLOXACIN 3 MG/ML
1 SOLUTION/ DROPS OPHTHALMIC
Status: COMPLETED | OUTPATIENT
Start: 2025-01-28 | End: 2025-01-28

## 2025-01-28 RX ORDER — ONDANSETRON HYDROCHLORIDE 2 MG/ML
INJECTION, SOLUTION INTRAVENOUS
Status: DISCONTINUED | OUTPATIENT
Start: 2025-01-28 | End: 2025-01-28

## 2025-01-28 RX ORDER — HYDROMORPHONE HYDROCHLORIDE 2 MG/ML
0.2 INJECTION, SOLUTION INTRAMUSCULAR; INTRAVENOUS; SUBCUTANEOUS EVERY 5 MIN PRN
OUTPATIENT
Start: 2025-01-28

## 2025-01-28 RX ORDER — EPINEPHRINE 1 MG/ML
INJECTION, SOLUTION, CONCENTRATE INTRAVENOUS
Status: DISCONTINUED | OUTPATIENT
Start: 2025-01-28 | End: 2025-01-28 | Stop reason: HOSPADM

## 2025-01-28 RX ORDER — MEPERIDINE HYDROCHLORIDE 25 MG/ML
12.5 INJECTION INTRAMUSCULAR; INTRAVENOUS; SUBCUTANEOUS EVERY 10 MIN PRN
OUTPATIENT
Start: 2025-01-28 | End: 2025-01-29

## 2025-01-28 RX ORDER — FENTANYL CITRATE 50 UG/ML
INJECTION, SOLUTION INTRAMUSCULAR; INTRAVENOUS
Status: DISCONTINUED | OUTPATIENT
Start: 2025-01-28 | End: 2025-01-28

## 2025-01-28 RX ADMIN — FENTANYL CITRATE 25 MCG: 50 INJECTION INTRAMUSCULAR; INTRAVENOUS at 01:01

## 2025-01-28 RX ADMIN — MIDAZOLAM HYDROCHLORIDE 2 MG: 1 INJECTION, SOLUTION INTRAMUSCULAR; INTRAVENOUS at 01:01

## 2025-01-28 RX ADMIN — BUPIVACAINE HYDROCHLORIDE 0.75 MG: 7.5 INJECTION, SOLUTION EPIDURAL; RETROBULBAR at 12:01

## 2025-01-28 RX ADMIN — ONDANSETRON HYDROCHLORIDE 4 MG: 2 SOLUTION INTRAMUSCULAR; INTRAVENOUS at 01:01

## 2025-01-28 RX ADMIN — OFLOXACIN 1 DROP: 3 SOLUTION/ DROPS OPHTHALMIC at 12:01

## 2025-01-28 RX ADMIN — TROPICAMIDE 2 DROP: 10 SOLUTION/ DROPS OPHTHALMIC at 12:01

## 2025-01-28 RX ADMIN — PREDNISOLONE ACETATE 1 DROP: 10 SUSPENSION/ DROPS OPHTHALMIC at 12:01

## 2025-01-28 RX ADMIN — PHENYLEPHRINE HYDROCHLORIDE 2 DROP: 100 SOLUTION/ DROPS OPHTHALMIC at 12:01

## 2025-01-28 NOTE — ANESTHESIA POSTPROCEDURE EVALUATION
Anesthesia Post Evaluation    Patient: Ricardo Castro    Procedure(s) Performed: Procedure(s) (LRB):  PHACOEMULSIFICATION, CATARACT, WITH IOL INSERTION    ////left eye (Left)    Final Anesthesia Type: MAC      Patient location during evaluation: OPS  Patient participation: Yes- Able to Participate  Level of consciousness: awake and alert  Post-procedure vital signs: reviewed and stable  Pain management: adequate  Airway patency: patent    PONV status at discharge: No PONV  Anesthetic complications: no      Cardiovascular status: blood pressure returned to baseline  Respiratory status: unassisted, room air and spontaneous ventilation  Hydration status: euvolemic  Follow-up not needed.              Vitals Value Taken Time   /69 01/28/25 1217   Temp 36.4 °C (97.5 °F) 01/28/25 1217   Pulse 53 01/28/25 1217   Resp 14 01/28/25 1331   SpO2 99 % 01/28/25 1217         No case tracking events are documented in the log.      Pain/Anita Score: No data recorded

## 2025-01-28 NOTE — DISCHARGE SUMMARY
[facility] Surgical - Periop Services 2nd Floor  Discharge Note  Short Stay    Procedure(s) (LRB):  PHACOEMULSIFICATION, CATARACT, WITH IOL INSERTION    ////left eye (Left)      OUTCOME: Patient tolerated treatment/procedure well without complication and is now ready for discharge.    DISPOSITION: Home or Self Care    FINAL DIAGNOSIS:  Pseudophakia Left eye    FOLLOWUP: In clinic    DISCHARGE INSTRUCTIONS:  Review patient's discharge instructions in chart     TIME SPENT ON DISCHARGE: 5 minutes

## 2025-01-28 NOTE — OP NOTE
[facility] Surgical - Periop Services 2nd Floor  Operative Note      Date of Procedure: 1/28/2025     Procedure: Procedure(s) (LRB):  PHACOEMULSIFICATION, CATARACT, WITH IOL INSERTION    ////left eye (Left)     Surgeons and Role:     * Mahesh Montoya MD - Primary    Assisting : MARIAH White    Pre-Operative Diagnosis: Combined forms of age-related cataract of left eye [H25.812]    Post-Operative Diagnosis: Post-Op Diagnosis Codes:     * Combined forms of age-related cataract of left eye [H25.812]    Anesthesia: Monitor Anesthesia Care    Operative Findings (including complications, if any):     Description of Technical Procedures: The patient was brought into the operating suite, where the patient was correctly identified as was the operative eye via timeout.  The patient was prepped and draped in a sterile ophthalmic fashion.  A lid speculum was placed in the operative eye and the microscope was brought into place.  A 1.0mm paracentesis was then made at (6) o'clock.  The anterior chamber was filled with Endocoat.  A (temporal) clear corneal incision was made with a 2.4 mm keratome.  A 6 mm corneal marking ring was used to cintia the cornea centered over the visual axis.  A 5.00 mm continuous curvilinear capsulorhexis was fashioned using a cystotome and microcapsular forceps.  Hydrodissection and hydrodelineation was performed with upreserved 1% Xylocaine.  The nucleus was then phacoemulsified with the Abbott phacoemulsification hand-piece with a total of (1) EFX.  The cortex was then removed with the I/A hand-piece. The lens model (DIB00) with a power of (23.5) was placed in the capsular bag.  The Helon was then removed from the eye with the I/A hand piece.  The anterior chamber was inflated and the wounds were hydrated with BSS.  The wounds were checked with Weck-Anju sponges and found to be watertight.  The lid speculum was removed and topical antibiotics were placed on the operative eye.  The patient was  brought to PACU in good condition

## 2025-01-28 NOTE — ANESTHESIA PREPROCEDURE EVALUATION
01/27/2025  Ricardo Castro is a 64 y.o., male here for L eye cataract surgery with IOL insertion.     NPO Status: 2100   Allergies: No Known Allergies     Pre Vitals  Current as of 01/28/25 1252  BP: 147/87 Pulse: 71   Resp: SpO2: 99   Temp: 36.4 °C (97.5 °F)     Past Medical History   Diabetes mellitus Hypertension   Acquired keratoderma Right foot ulcer, with fat layer exposed     Surgical History    AMPUTATION left arm surgery   TOE AMPUTATION      Substance History    Smoking Status: Never   Smokeless Tobacco Status: Current   Alcohol use: Yes, unspecified volume   Drug use: Never       Prescription Medications  Within last 14 days from 01/27/25   Last Taken Last Updated   amoxicillin-clavulanate 875-125mg (AUGMENTIN) 875-125 mg per tablet        lisinopriL (PRINIVIL,ZESTRIL) 40 MG tablet    Past Week 02/21/24 0931   tirzepatide (MOUNJARO) 7.5 mg/0.5 mL PnIj    2/11/2024 02/21/24 0931      EKG 2/16/24   Normal sinus rhythm   Normal ECG     Pre-op Assessment    I have reviewed the Patient Summary Reports.     I have reviewed the Nursing Notes. I have reviewed the NPO Status.   I have reviewed the Medications.     Review of Systems  Anesthesia Hx:  No problems with previous Anesthesia   History of prior surgery of interest to airway management or planning:  Previous anesthesia: General        Denies Family Hx of Anesthesia complications.    Denies Personal Hx of Anesthesia complications.                    Social:  Non-Smoker, Social Alcohol Use       Hematology/Oncology:    Oncology Normal    -- Denies Anemia:                                  Cardiovascular:  Exercise tolerance: good   Hypertension (took lisinopril this AM), well controlled              ECG has been reviewed.                      Hypertension         Pulmonary:    Denies COPD.  Denies Asthma.     Denies Sleep Apnea.                 Renal/:   Denies Chronic Renal Disease.                Hepatic/GI:      Denies GERD.    Taking GLP-1 Agonists (last dose approximately 3 days ago) Not Instructed to Hold for 7 Days No Reported GI Symptoms          Musculoskeletal:  Musculoskeletal Normal                Neurological:  Denies TIA.                                     Endocrine:  Diabetes, well controlled, type 2    Diabetes                      Dermatological:  Skin Normal    Psych:  Psychiatric Normal                    Physical Exam  General: Well nourished, Cooperative, Alert and Oriented    Airway:  Mallampati: I   Mouth Opening: Normal  TM Distance: Normal  Tongue: Normal  Neck ROM: Normal ROM    Dental:  Intact    Chest/Lungs:  Clear to auscultation, Normal Respiratory Rate    Heart:  Rate: Normal  Rhythm: Regular Rhythm  Sounds: Normal    Abdomen:  Normal, Soft, Nontender        Anesthesia Plan  Type of Anesthesia, risks & benefits discussed:    Anesthesia Type: MAC  Intra-op Monitoring Plan: Standard ASA Monitors  Post Op Pain Control Plan:   (medical reason for not using multimodal pain management)  Informed Consent: Informed consent signed with the Patient and all parties understand the risks and agree with anesthesia plan.  All questions answered. Patient consented to blood products? No  ASA Score: 2  Day of Surgery Review of History & Physical: H&P Update referred to the surgeon/provider.    Ready For Surgery From Anesthesia Perspective.     .

## 2025-02-11 ENCOUNTER — ANESTHESIA EVENT (OUTPATIENT)
Facility: HOSPITAL | Age: 65
End: 2025-02-11
Payer: COMMERCIAL

## 2025-02-11 ENCOUNTER — HOSPITAL ENCOUNTER (OUTPATIENT)
Facility: HOSPITAL | Age: 65
Discharge: HOME | End: 2025-02-11
Attending: OPHTHALMOLOGY | Admitting: OPHTHALMOLOGY
Payer: COMMERCIAL

## 2025-02-11 ENCOUNTER — ANESTHESIA (OUTPATIENT)
Facility: HOSPITAL | Age: 65
End: 2025-02-11
Payer: COMMERCIAL

## 2025-02-11 VITALS
SYSTOLIC BLOOD PRESSURE: 115 MMHG | TEMPERATURE: 98 F | DIASTOLIC BLOOD PRESSURE: 64 MMHG | OXYGEN SATURATION: 99 % | HEART RATE: 57 BPM

## 2025-02-11 DIAGNOSIS — H26.9 CATARACT: ICD-10-CM

## 2025-02-11 LAB
POCT GLUCOSE: 179 MG/DL (ref 70–110)
POCT GLUCOSE: 179 MG/DL (ref 70–110)

## 2025-02-11 PROCEDURE — V2632 POST CHMBR INTRAOCULAR LENS: HCPCS | Performed by: OPHTHALMOLOGY

## 2025-02-11 PROCEDURE — 27201423 OPTIME MED/SURG SUP & DEVICES STERILE SUPPLY: Performed by: OPHTHALMOLOGY

## 2025-02-11 PROCEDURE — 63600175 PHARM REV CODE 636 W HCPCS: Performed by: OPHTHALMOLOGY

## 2025-02-11 PROCEDURE — 63600175 PHARM REV CODE 636 W HCPCS: Performed by: NURSE ANESTHETIST, CERTIFIED REGISTERED

## 2025-02-11 PROCEDURE — 25000003 PHARM REV CODE 250

## 2025-02-11 PROCEDURE — 25000003 PHARM REV CODE 250: Performed by: OPHTHALMOLOGY

## 2025-02-11 PROCEDURE — 37000008 HC ANESTHESIA 1ST 15 MINUTES: Performed by: OPHTHALMOLOGY

## 2025-02-11 PROCEDURE — 36000706: Performed by: OPHTHALMOLOGY

## 2025-02-11 PROCEDURE — 37000009 HC ANESTHESIA EA ADD 15 MINS: Performed by: OPHTHALMOLOGY

## 2025-02-11 PROCEDURE — 71000015 HC POSTOP RECOV 1ST HR: Performed by: OPHTHALMOLOGY

## 2025-02-11 PROCEDURE — 36000707: Performed by: OPHTHALMOLOGY

## 2025-02-11 DEVICE — TECNIS SIMPLICITY TECNIS EYHANCE U 24.0D
Type: IMPLANTABLE DEVICE | Site: EYE | Status: FUNCTIONAL
Brand: TECNIS SIMPLICITY

## 2025-02-11 RX ORDER — MEPERIDINE HYDROCHLORIDE 25 MG/ML
12.5 INJECTION INTRAMUSCULAR; INTRAVENOUS; SUBCUTANEOUS EVERY 10 MIN PRN
Status: DISCONTINUED | OUTPATIENT
Start: 2025-02-11 | End: 2025-02-11 | Stop reason: HOSPADM

## 2025-02-11 RX ORDER — GLUCAGON 1 MG
1 KIT INJECTION
Status: DISCONTINUED | OUTPATIENT
Start: 2025-02-11 | End: 2025-02-11 | Stop reason: HOSPADM

## 2025-02-11 RX ORDER — HYDROMORPHONE HYDROCHLORIDE 2 MG/ML
0.2 INJECTION, SOLUTION INTRAMUSCULAR; INTRAVENOUS; SUBCUTANEOUS EVERY 5 MIN PRN
Status: DISCONTINUED | OUTPATIENT
Start: 2025-02-11 | End: 2025-02-11 | Stop reason: HOSPADM

## 2025-02-11 RX ORDER — EPINEPHRINE 1 MG/ML
INJECTION, SOLUTION, CONCENTRATE INTRAVENOUS
Status: DISCONTINUED | OUTPATIENT
Start: 2025-02-11 | End: 2025-02-11 | Stop reason: HOSPADM

## 2025-02-11 RX ORDER — BUPIVACAINE HYDROCHLORIDE 7.5 MG/ML
0.1 INJECTION, SOLUTION EPIDURAL; RETROBULBAR
Status: COMPLETED | OUTPATIENT
Start: 2025-02-11 | End: 2025-02-11

## 2025-02-11 RX ORDER — ACETAMINOPHEN 500 MG
1000 TABLET ORAL ONCE
Status: DISCONTINUED | OUTPATIENT
Start: 2025-02-11 | End: 2025-02-11 | Stop reason: HOSPADM

## 2025-02-11 RX ORDER — TROPICAMIDE 10 MG/ML
2 SOLUTION/ DROPS OPHTHALMIC
Status: COMPLETED | OUTPATIENT
Start: 2025-02-11 | End: 2025-02-11

## 2025-02-11 RX ORDER — PREDNISOLONE/MOXIFLOX/BROMFEN 1 %-0.5 %
1 SUSPENSION, DROPS(FINAL DOSAGE FORM)(ML) OPHTHALMIC (EYE)
Status: COMPLETED | OUTPATIENT
Start: 2025-02-11 | End: 2025-02-11

## 2025-02-11 RX ORDER — BUPIVACAINE HYDROCHLORIDE 7.5 MG/ML
INJECTION, SOLUTION EPIDURAL; RETROBULBAR
Status: DISCONTINUED | OUTPATIENT
Start: 2025-02-11 | End: 2025-02-11 | Stop reason: HOSPADM

## 2025-02-11 RX ORDER — HYDRALAZINE HYDROCHLORIDE 20 MG/ML
10 INJECTION INTRAMUSCULAR; INTRAVENOUS ONCE
Status: DISCONTINUED | OUTPATIENT
Start: 2025-02-11 | End: 2025-02-11 | Stop reason: HOSPADM

## 2025-02-11 RX ORDER — OXYCODONE AND ACETAMINOPHEN 5; 325 MG/1; MG/1
1 TABLET ORAL
Status: DISCONTINUED | OUTPATIENT
Start: 2025-02-11 | End: 2025-02-11 | Stop reason: HOSPADM

## 2025-02-11 RX ORDER — LIDOCAINE HYDROCHLORIDE 40 MG/ML
INJECTION, SOLUTION RETROBULBAR
Status: DISCONTINUED | OUTPATIENT
Start: 2025-02-11 | End: 2025-02-11 | Stop reason: HOSPADM

## 2025-02-11 RX ORDER — POVIDONE-IODINE 5 %
SOLUTION, NON-ORAL OPHTHALMIC (EYE)
Status: DISCONTINUED | OUTPATIENT
Start: 2025-02-11 | End: 2025-02-11 | Stop reason: HOSPADM

## 2025-02-11 RX ORDER — PHENYLEPHRINE HYDROCHLORIDE 100 MG/ML
2 SOLUTION/ DROPS OPHTHALMIC
Status: COMPLETED | OUTPATIENT
Start: 2025-02-11 | End: 2025-02-11

## 2025-02-11 RX ORDER — MIDAZOLAM HYDROCHLORIDE 1 MG/ML
INJECTION INTRAMUSCULAR; INTRAVENOUS
Status: DISCONTINUED | OUTPATIENT
Start: 2025-02-11 | End: 2025-02-11

## 2025-02-11 RX ADMIN — BUPIVACAINE HYDROCHLORIDE 0.75 MG: 7.5 INJECTION, SOLUTION EPIDURAL; RETROBULBAR at 06:02

## 2025-02-11 RX ADMIN — Medication 1 DROP: at 06:02

## 2025-02-11 RX ADMIN — PHENYLEPHRINE HYDROCHLORIDE 2 DROP: 100 SOLUTION/ DROPS OPHTHALMIC at 06:02

## 2025-02-11 RX ADMIN — TROPICAMIDE 2 DROP: 10 SOLUTION/ DROPS OPHTHALMIC at 06:02

## 2025-02-11 RX ADMIN — MIDAZOLAM HYDROCHLORIDE 2 MG: 1 INJECTION, SOLUTION INTRAMUSCULAR; INTRAVENOUS at 07:02

## 2025-02-11 NOTE — OP NOTE
[facility] Surgical - Periop Services 2nd Floor  Operative Note      Date of Procedure: 2/11/2025     Procedure: Procedure(s) (LRB):  PHACOEMULSIFICATION, CATARACT, WITH IOL INSERTION    /////right eye (Right)     Surgeons and Role:     * Mahesh Montoya MD - Primary    Assisting : MARIAH White    Pre-Operative Diagnosis: Combined forms of age-related cataract of right eye [H25.811]    Post-Operative Diagnosis: Post-Op Diagnosis Codes:     * Combined forms of age-related cataract of right eye [H25.811]    Anesthesia: Monitor Anesthesia Care    Operative Findings (including complications, if any):     Description of Technical Procedures: The patient was brought into the operating suite, where the patient was correctly identified as was the operative eye via timeout.  The patient was prepped and draped in a sterile ophthalmic fashion.  A lid speculum was placed in the operative eye and the microscope was brought into place.  A 1.0mm paracentesis was then made at (12) o'clock.  The anterior chamber was filled with Endocoat.  A (temporal) clear corneal incision was made with a 2.4 mm keratome.  A 6 mm corneal marking ring was used to cintia the cornea centered over the visual axis.  A 5.00 mm continuous curvilinear capsulorhexis was fashioned using a cystotome and microcapsular forceps.  Hydrodissection and hydrodelineation was performed with upreserved 1% Xylocaine.  The nucleus was then phacoemulsified with the Abbott phacoemulsification hand-piece with a total of (1) EFX.  The cortex was then removed with the I/A hand-piece. The lens model (DIB00) with a power of (24.0) was placed in the capsular bag.  The Helon was then removed from the eye with the I/A hand piece.  The anterior chamber was inflated and the wounds were hydrated with BSS.  The wounds were checked with Weck-Anju sponges and found to be watertight.  The lid speculum was removed and topical antibiotics were placed on the operative eye.  The  patient was brought to PACU in good condition

## 2025-02-11 NOTE — ANESTHESIA POSTPROCEDURE EVALUATION
Anesthesia Post Evaluation    Patient: Ricardo Castro    Procedure(s) Performed: Procedure(s) (LRB):  PHACOEMULSIFICATION, CATARACT, WITH IOL INSERTION    /////right eye (Right)    Final Anesthesia Type: MAC      Patient location during evaluation: OPS  Patient participation: Yes- Able to Participate  Level of consciousness: awake and alert  Post-procedure vital signs: reviewed and stable  Pain management: adequate  Airway patency: patent    PONV status at discharge: No PONV  Anesthetic complications: no      Cardiovascular status: blood pressure returned to baseline  Respiratory status: unassisted, room air and spontaneous ventilation  Hydration status: euvolemic  Follow-up not needed.              Vitals Value Taken Time   /75 02/11/25 0626   Temp 36.6 °C (97.8 °F) 02/11/25 0625   Pulse 80 02/11/25 0625   Resp 22 02/11/25 0752   SpO2 98 % 02/11/25 0625         No case tracking events are documented in the log.      Pain/Anita Score: No data recorded

## 2025-02-11 NOTE — DISCHARGE SUMMARY
[facility] Surgical - Periop Services 2nd Floor  Discharge Note  Short Stay    Procedure(s) (LRB):  PHACOEMULSIFICATION, CATARACT, WITH IOL INSERTION    /////right eye (Right)      OUTCOME: Patient tolerated treatment/procedure well without complication and is now ready for discharge.    DISPOSITION: Home or Self Care    FINAL DIAGNOSIS:  Pseudophakia right eye     FOLLOWUP: In clinic    DISCHARGE INSTRUCTIONS:  Review patient's discharge instructions in chart     TIME SPENT ON DISCHARGE: 5 minutes

## 2025-02-11 NOTE — ANESTHESIA PREPROCEDURE EVALUATION
02/11/2025  Ricardo Castro is a 64 y.o., male here for L eye cataract surgery with IOL insertion.     NPO Status: 2100   Allergies: No Known Allergies     Pre Vitals  Current as of 01/28/25 1252  BP: 147/87 Pulse: 71   Resp: SpO2: 99   Temp: 36.4 °C (97.5 °F)     Past Medical History   Diabetes mellitus Hypertension   Acquired keratoderma Right foot ulcer, with fat layer exposed     Surgical History    AMPUTATION left arm surgery   TOE AMPUTATION      Substance History    Smoking Status: Never   Smokeless Tobacco Status: Current   Alcohol use: Yes, unspecified volume   Drug use: Never       Prescription Medications  Within last 14 days from 01/27/25   Last Taken Last Updated   amoxicillin-clavulanate 875-125mg (AUGMENTIN) 875-125 mg per tablet        lisinopriL (PRINIVIL,ZESTRIL) 40 MG tablet    Past Week 02/21/24 0931   tirzepatide (MOUNJARO) 7.5 mg/0.5 mL PnIj    2/11/2024 02/21/24 0931         Latest Reference Range & Units 02/16/24 10:11   WBC 4.50 - 11.50 x10(3)/mcL 5.01   RBC 4.70 - 6.10 x10(6)/mcL 4.53 (L)   Hemoglobin 14.0 - 18.0 g/dL 13.0 (L)   Hematocrit 42.0 - 52.0 % 40.8 (L)   Platelet Count 130 - 400 x10(3)/mcL 212   Sodium 136 - 145 mmol/L 140   Potassium 3.5 - 5.1 mmol/L 4.2   Chloride 98 - 107 mmol/L 105   CO2 23 - 31 mmol/L 29   Anion Gap mEq/L 6.0   BUN 8.4 - 25.7 mg/dL 15.9   Creatinine 0.73 - 1.18 mg/dL 1.28 (H)   BUN/CREAT RATIO  12   eGFR mls/min/1.73/m2 >60   Glucose 82 - 115 mg/dL 108   Calcium 8.8 - 10.0 mg/dL 9.1     EKG 2/16/24   Normal sinus rhythm   Normal ECG     Pre-op Assessment    I have reviewed the Patient Summary Reports.     I have reviewed the Nursing Notes. I have reviewed the NPO Status.   I have reviewed the Medications.     Review of Systems  Anesthesia Hx:  No problems with previous Anesthesia   History of prior surgery of interest to airway management or planning:   Previous anesthesia: General        Denies Family Hx of Anesthesia complications.    Denies Personal Hx of Anesthesia complications.                    Social:  Non-Smoker, Social Alcohol Use       Hematology/Oncology:    Oncology Normal    -- Denies Anemia:                                  Cardiovascular:  Exercise tolerance: good   Hypertension, well controlled              ECG has been reviewed.                      Hypertension         Pulmonary:    Denies COPD.  Denies Asthma.     Denies Sleep Apnea.                Renal/:   Denies Chronic Renal Disease.                Hepatic/GI:      Denies GERD.    Taking GLP-1 Agonists (last dose approximately 3 days ago) Not Instructed to Hold for 7 Days No Reported GI Symptoms          Musculoskeletal:  Musculoskeletal Normal                Neurological:  Denies TIA.                                     Endocrine:  Diabetes, well controlled, type 2    Diabetes                      Dermatological:  Skin Normal    Psych:  Psychiatric Normal                    Physical Exam  General: Well nourished, Cooperative, Alert and Oriented    Airway:  Mallampati: I   Mouth Opening: Normal  TM Distance: Normal  Tongue: Normal  Neck ROM: Normal ROM    Dental:  Intact    Chest/Lungs:  Clear to auscultation, Normal Respiratory Rate    Heart:  Rate: Normal  Rhythm: Regular Rhythm  Sounds: Normal    Abdomen:  Normal, Soft, Nontender        Anesthesia Plan  Type of Anesthesia, risks & benefits discussed:    Anesthesia Type: MAC  Intra-op Monitoring Plan: Standard ASA Monitors  Post Op Pain Control Plan:   (medical reason for not using multimodal pain management)  Informed Consent: Informed consent signed with the Patient and all parties understand the risks and agree with anesthesia plan.  All questions answered. Patient consented to blood products? No  ASA Score: 2  Day of Surgery Review of History & Physical: H&P Update referred to the surgeon/provider.    Ready For Surgery From  Anesthesia Perspective.     .

## 2025-02-11 NOTE — TRANSFER OF CARE
Anesthesia Transfer of Care Note    Patient: Ricardo Castro    Procedure(s) Performed: Procedure(s) (LRB):  PHACOEMULSIFICATION, CATARACT, WITH IOL INSERTION    /////right eye (Right)    Patient location: OPS    Anesthesia Type: MAC    Transport from OR: Transported from OR on room air with adequate spontaneous ventilation    Post pain: adequate analgesia    Post assessment: no apparent anesthetic complications    Post vital signs: stable    Level of consciousness: awake    Nausea/Vomiting: no nausea/vomiting    Complications: none    Transfer of care protocol was followed      Last vitals: Visit Vitals  BP (!) 153/84   Pulse 80   Temp 36.6 °C (97.8 °F) (Oral)   SpO2 98%

## 2025-02-17 RX ORDER — LISINOPRIL 2.5 MG/1
2.5 TABLET ORAL DAILY
COMMUNITY

## 2025-02-19 ENCOUNTER — HOSPITAL ENCOUNTER (OUTPATIENT)
Facility: HOSPITAL | Age: 65
Discharge: HOME | End: 2025-02-19
Payer: COMMERCIAL

## 2025-02-19 ENCOUNTER — ANESTHESIA (OUTPATIENT)
Dept: SURGERY | Facility: HOSPITAL | Age: 65
End: 2025-02-19
Payer: COMMERCIAL

## 2025-02-19 ENCOUNTER — ANESTHESIA EVENT (OUTPATIENT)
Dept: SURGERY | Facility: HOSPITAL | Age: 65
End: 2025-02-19
Payer: COMMERCIAL

## 2025-02-19 DIAGNOSIS — M20.21 HALLUX RIGIDUS OF RIGHT FOOT: Primary | ICD-10-CM

## 2025-02-19 LAB — POCT GLUCOSE: 190 MG/DL (ref 70–110)

## 2025-02-19 PROCEDURE — 27201423 OPTIME MED/SURG SUP & DEVICES STERILE SUPPLY

## 2025-02-19 PROCEDURE — 63600175 PHARM REV CODE 636 W HCPCS

## 2025-02-19 PROCEDURE — 37000008 HC ANESTHESIA 1ST 15 MINUTES

## 2025-02-19 PROCEDURE — 37000009 HC ANESTHESIA EA ADD 15 MINS

## 2025-02-19 PROCEDURE — 82962 GLUCOSE BLOOD TEST: CPT

## 2025-02-19 PROCEDURE — 71000015 HC POSTOP RECOV 1ST HR

## 2025-02-19 PROCEDURE — 63600175 PHARM REV CODE 636 W HCPCS: Performed by: NURSE ANESTHETIST, CERTIFIED REGISTERED

## 2025-02-19 PROCEDURE — 25000003 PHARM REV CODE 250: Performed by: NURSE ANESTHETIST, CERTIFIED REGISTERED

## 2025-02-19 PROCEDURE — 36000708 HC OR TIME LEV III 1ST 15 MIN

## 2025-02-19 PROCEDURE — 71000016 HC POSTOP RECOV ADDL HR

## 2025-02-19 PROCEDURE — 36000709 HC OR TIME LEV III EA ADD 15 MIN

## 2025-02-19 PROCEDURE — 71000033 HC RECOVERY, INTIAL HOUR

## 2025-02-19 RX ORDER — LIDOCAINE HYDROCHLORIDE 10 MG/ML
INJECTION, SOLUTION INFILTRATION; PERINEURAL
Status: DISCONTINUED | OUTPATIENT
Start: 2025-02-19 | End: 2025-02-19 | Stop reason: HOSPADM

## 2025-02-19 RX ORDER — PROPOFOL 10 MG/ML
VIAL (ML) INTRAVENOUS
Status: DISCONTINUED | OUTPATIENT
Start: 2025-02-19 | End: 2025-02-19

## 2025-02-19 RX ORDER — LIDOCAINE HYDROCHLORIDE 10 MG/ML
INJECTION, SOLUTION EPIDURAL; INFILTRATION; INTRACAUDAL; PERINEURAL
Status: DISCONTINUED | OUTPATIENT
Start: 2025-02-19 | End: 2025-02-19

## 2025-02-19 RX ORDER — ROCURONIUM BROMIDE 10 MG/ML
INJECTION, SOLUTION INTRAVENOUS
Status: DISCONTINUED | OUTPATIENT
Start: 2025-02-19 | End: 2025-02-19

## 2025-02-19 RX ORDER — GLUCAGON 1 MG
1 KIT INJECTION
Status: DISCONTINUED | OUTPATIENT
Start: 2025-02-19 | End: 2025-02-19 | Stop reason: HOSPADM

## 2025-02-19 RX ORDER — SUCCINYLCHOLINE CHLORIDE 20 MG/ML
INJECTION INTRAMUSCULAR; INTRAVENOUS
Status: DISCONTINUED | OUTPATIENT
Start: 2025-02-19 | End: 2025-02-19

## 2025-02-19 RX ORDER — BUPIVACAINE HYDROCHLORIDE 5 MG/ML
INJECTION, SOLUTION EPIDURAL; INTRACAUDAL
Status: DISCONTINUED
Start: 2025-02-19 | End: 2025-02-19 | Stop reason: HOSPADM

## 2025-02-19 RX ORDER — MIDAZOLAM HYDROCHLORIDE 2 MG/2ML
INJECTION, SOLUTION INTRAMUSCULAR; INTRAVENOUS
Status: COMPLETED
Start: 2025-02-19 | End: 2025-02-19

## 2025-02-19 RX ORDER — HYDROCODONE BITARTRATE AND ACETAMINOPHEN 5; 325 MG/1; MG/1
1 TABLET ORAL EVERY 4 HOURS PRN
Refills: 0 | OUTPATIENT
Start: 2025-02-19

## 2025-02-19 RX ORDER — SODIUM CHLORIDE, SODIUM GLUCONATE, SODIUM ACETATE, POTASSIUM CHLORIDE AND MAGNESIUM CHLORIDE 30; 37; 368; 526; 502 MG/100ML; MG/100ML; MG/100ML; MG/100ML; MG/100ML
INJECTION, SOLUTION INTRAVENOUS CONTINUOUS
Status: DISCONTINUED | OUTPATIENT
Start: 2025-02-19 | End: 2025-02-19 | Stop reason: HOSPADM

## 2025-02-19 RX ORDER — IPRATROPIUM BROMIDE AND ALBUTEROL SULFATE 2.5; .5 MG/3ML; MG/3ML
3 SOLUTION RESPIRATORY (INHALATION)
Status: DISCONTINUED | OUTPATIENT
Start: 2025-02-19 | End: 2025-02-19 | Stop reason: HOSPADM

## 2025-02-19 RX ORDER — PROCHLORPERAZINE EDISYLATE 5 MG/ML
5 INJECTION INTRAMUSCULAR; INTRAVENOUS EVERY 30 MIN PRN
Status: DISCONTINUED | OUTPATIENT
Start: 2025-02-19 | End: 2025-02-19 | Stop reason: HOSPADM

## 2025-02-19 RX ORDER — HYDROMORPHONE HYDROCHLORIDE 2 MG/ML
0.4 INJECTION, SOLUTION INTRAMUSCULAR; INTRAVENOUS; SUBCUTANEOUS EVERY 5 MIN PRN
Status: DISCONTINUED | OUTPATIENT
Start: 2025-02-19 | End: 2025-02-19 | Stop reason: HOSPADM

## 2025-02-19 RX ORDER — LIDOCAINE HYDROCHLORIDE 10 MG/ML
1 INJECTION, SOLUTION EPIDURAL; INFILTRATION; INTRACAUDAL; PERINEURAL ONCE
Status: DISCONTINUED | OUTPATIENT
Start: 2025-02-19 | End: 2025-02-19 | Stop reason: HOSPADM

## 2025-02-19 RX ORDER — MIDAZOLAM HYDROCHLORIDE 2 MG/2ML
2 INJECTION, SOLUTION INTRAMUSCULAR; INTRAVENOUS ONCE AS NEEDED
Status: COMPLETED | OUTPATIENT
Start: 2025-02-19 | End: 2025-02-19

## 2025-02-19 RX ORDER — FENTANYL CITRATE 50 UG/ML
INJECTION, SOLUTION INTRAMUSCULAR; INTRAVENOUS
Status: DISCONTINUED | OUTPATIENT
Start: 2025-02-19 | End: 2025-02-19

## 2025-02-19 RX ORDER — CEFAZOLIN 2 G/1
2 INJECTION, POWDER, FOR SOLUTION INTRAMUSCULAR; INTRAVENOUS
Status: COMPLETED | OUTPATIENT
Start: 2025-02-19 | End: 2025-02-19

## 2025-02-19 RX ORDER — EPHEDRINE SULFATE 50 MG/ML
INJECTION, SOLUTION INTRAVENOUS
Status: DISCONTINUED | OUTPATIENT
Start: 2025-02-19 | End: 2025-02-19

## 2025-02-19 RX ORDER — LIDOCAINE HYDROCHLORIDE 10 MG/ML
INJECTION, SOLUTION INFILTRATION; PERINEURAL
Status: DISCONTINUED
Start: 2025-02-19 | End: 2025-02-19 | Stop reason: HOSPADM

## 2025-02-19 RX ORDER — ONDANSETRON 4 MG/1
8 TABLET, ORALLY DISINTEGRATING ORAL EVERY 6 HOURS PRN
Status: DISCONTINUED | OUTPATIENT
Start: 2025-02-19 | End: 2025-02-19 | Stop reason: HOSPADM

## 2025-02-19 RX ORDER — ONDANSETRON HYDROCHLORIDE 2 MG/ML
4 INJECTION, SOLUTION INTRAVENOUS DAILY PRN
Status: DISCONTINUED | OUTPATIENT
Start: 2025-02-19 | End: 2025-02-19 | Stop reason: HOSPADM

## 2025-02-19 RX ORDER — BUPIVACAINE HYDROCHLORIDE 5 MG/ML
INJECTION, SOLUTION EPIDURAL; INTRACAUDAL
Status: DISCONTINUED | OUTPATIENT
Start: 2025-02-19 | End: 2025-02-19 | Stop reason: HOSPADM

## 2025-02-19 RX ORDER — HYDROMORPHONE HYDROCHLORIDE 2 MG/ML
0.2 INJECTION, SOLUTION INTRAMUSCULAR; INTRAVENOUS; SUBCUTANEOUS EVERY 5 MIN PRN
Status: DISCONTINUED | OUTPATIENT
Start: 2025-02-19 | End: 2025-02-19 | Stop reason: HOSPADM

## 2025-02-19 RX ORDER — ONDANSETRON HYDROCHLORIDE 2 MG/ML
INJECTION, SOLUTION INTRAMUSCULAR; INTRAVENOUS
Status: DISCONTINUED | OUTPATIENT
Start: 2025-02-19 | End: 2025-02-19

## 2025-02-19 RX ORDER — HYDROCODONE BITARTRATE AND ACETAMINOPHEN 5; 325 MG/1; MG/1
1 TABLET ORAL EVERY 4 HOURS PRN
Refills: 0 | Status: DISCONTINUED | OUTPATIENT
Start: 2025-02-19 | End: 2025-02-19 | Stop reason: HOSPADM

## 2025-02-19 RX ADMIN — SUCCINYLCHOLINE CHLORIDE 140 MG: 20 INJECTION, SOLUTION INTRAMUSCULAR; INTRAVENOUS at 11:02

## 2025-02-19 RX ADMIN — MIDAZOLAM HYDROCHLORIDE 2 MG: 2 INJECTION, SOLUTION INTRAMUSCULAR; INTRAVENOUS at 10:02

## 2025-02-19 RX ADMIN — ROCURONIUM BROMIDE 20 MG: 10 INJECTION, SOLUTION INTRAVENOUS at 11:02

## 2025-02-19 RX ADMIN — EPHEDRINE SULFATE 5 MG: 50 INJECTION INTRAVENOUS at 12:02

## 2025-02-19 RX ADMIN — ONDANSETRON 4 MG: 2 INJECTION INTRAMUSCULAR; INTRAVENOUS at 12:02

## 2025-02-19 RX ADMIN — FENTANYL CITRATE 100 MCG: 50 INJECTION, SOLUTION INTRAMUSCULAR; INTRAVENOUS at 11:02

## 2025-02-19 RX ADMIN — ROCURONIUM BROMIDE 5 MG: 10 INJECTION, SOLUTION INTRAVENOUS at 11:02

## 2025-02-19 RX ADMIN — CEFAZOLIN 2 G: 2 INJECTION, POWDER, FOR SOLUTION INTRAMUSCULAR; INTRAVENOUS at 11:02

## 2025-02-19 RX ADMIN — SUGAMMADEX 200 MG: 100 INJECTION, SOLUTION INTRAVENOUS at 12:02

## 2025-02-19 RX ADMIN — LIDOCAINE HYDROCHLORIDE 50 MG: 10 INJECTION, SOLUTION EPIDURAL; INFILTRATION; INTRACAUDAL; PERINEURAL at 11:02

## 2025-02-19 RX ADMIN — MIDAZOLAM HYDROCHLORIDE 2 MG: 1 INJECTION, SOLUTION INTRAMUSCULAR; INTRAVENOUS at 10:02

## 2025-02-19 RX ADMIN — EPHEDRINE SULFATE 10 MG: 50 INJECTION INTRAVENOUS at 11:02

## 2025-02-19 RX ADMIN — SODIUM CHLORIDE, POTASSIUM CHLORIDE, SODIUM LACTATE AND CALCIUM CHLORIDE: 600; 310; 30; 20 INJECTION, SOLUTION INTRAVENOUS at 11:02

## 2025-02-19 RX ADMIN — PROPOFOL 140 MG: 10 INJECTION, EMULSION INTRAVENOUS at 11:02

## 2025-02-19 RX ADMIN — EPHEDRINE SULFATE 5 MG: 50 INJECTION INTRAVENOUS at 11:02

## 2025-02-19 NOTE — TRANSFER OF CARE
Anesthesia Transfer of Care Note    Patient: Ricardo Castro    Procedure(s) Performed: Procedure(s) (LRB):  ARTHROPLASTY, TOE // Right Tabares Arthroplasty / surgery deposit 733.73 (Right)    Patient location: PACU    Anesthesia Type: general    Transport from OR: Transported from OR on room air with adequate spontaneous ventilation    Post pain: adequate analgesia    Post assessment: no apparent anesthetic complications    Post vital signs: stable    Level of consciousness: sedated    Nausea/Vomiting: no nausea/vomiting    Complications: none    Transfer of care protocol was followed      Last vitals: Visit Vitals  BP (!) 156/85   Pulse 71   Temp 36.7 °C (98 °F) (Oral)   Resp 20   Ht 6' (1.829 m)   Wt 104 kg (229 lb 4.5 oz)   SpO2 99%   BMI 31.10 kg/m²

## 2025-02-19 NOTE — ANESTHESIA PROCEDURE NOTES
Intubation    Date/Time: 2/19/2025 11:21 AM    Performed by: Marlen Miranda CRNA  Authorized by: Alan Campbell Jr., MD    Intubation:     Induction:  Intravenous    Intubated:  Postinduction    Mask Ventilation:  Easy mask    Attempts:  1    Attempted By:  CRNA    Blade:  Millard 2    Laryngeal View Grade: Grade I - full view of cords      Difficult Airway Encountered?: No      Complications:  None    Airway Device:  Oral endotracheal tube    Airway Device Size:  8.0    Style/Cuff Inflation:  Cuffed (inflated to minimal occlusive pressure)    Tube secured:  24    Secured at:  The lips    Placement Verified By:  Capnometry    Complicating Factors:  None    Findings Post-Intubation:  BS equal bilateral and atraumatic/condition of teeth unchanged  Notes:      RSI with Sux

## 2025-02-19 NOTE — OP NOTE
[facility] Surgical - Periop Services  Operative Note      Date of Procedure: 2/19/2025     Procedure:   Right Tabares arthroplasty    Surgeons and Role:     * Carmen Ferguson DPM - Primary    Assisting Surgeon: None    Pre-Operative Diagnosis: Right foot ulcer, limited to breakdown of skin [L97.511]  Hallux rigidus of right foot [M20.21]    Post-Operative Diagnosis: Post-Op Diagnosis Codes:     * Right foot ulcer, limited to breakdown of skin [L97.511]     * Hallux rigidus of right foot [M20.21]    Anesthesia: General    Operative Findings (including complications, if any):    Full-thickness ulcer on the plantar aspect of the right hallux interphalangeal joint. Less than 10 degrees of first metatarsal phalangeal joint dorsiflexion.   Dorsal, medial, and lateral osteophytes on the first metatarsal head.  Severe adhesion of the sesamoid complex to the metatarsal. Non-reducible lateral deviation of the hallux interphalangeal joint.    Description of Technical Procedures:      Patient was brought into the operating room and placed on the operating room table in the supine position.  After the induction of anesthesia and administration of IV antibiotics, a well-padded pneumatic tourniquet was placed on the patient's right calf.  The right foot was prepped and draped aseptically.  After exsanguination with an Esmarch, the tourniquet was inflated to 300 mmHg.     A 4.5 cm linear incision was placed along the first metatarsophalangeal joint.   With careful retraction of the extensor hallucis longus tendon and cautery of overlying venous structures, the capsule was incised and reflected.  Severe degeneration of the first metatarsal head was noted dorsally, medially, and laterally.  The metatarsal head was also noted to have a square shape.   First metatarsophalangeal joint cartilage was intact.  Degenerative dorsal and medial eminences of the first metatarsal head and widened, degenerative proximal phalanx base were  resected.  Adhesions of the sesamoids to the first metatarsal and lateral first metatarsophalangeal joint capsular structures were released.  First metatarsophalangeal joint dorsiflexion was improved to 30 degrees of dorsiflexion.   Joint alignment was evaluated with fluoroscopy.  The surgical site was flushed with copious amounts of sterile saline.  Layered closure was performed with 3-0 Monocryl, 4-0 Monocryl, and 4-0 nylon.  The incision was dressed with Betadine-soaked Adaptic, gauze, Kerlix, and Coban.   The tourniquet was deflated.      Patient was brought into recovery with vital signs stable and capillary refill intact to all digits on the right foot.    Significant Surgical Tasks Conducted by the Assistant(s), if Applicable: None    Estimated Blood Loss (EBL): * No values recorded between 2/19/2025 11:37 AM and 2/19/2025 12:50 PM *           Implants: * No implants in log *    Specimens:   Specimen (24h ago, onward)      None                    Condition: Good    Disposition: PACU - hemodynamically stable.    Attestation: I was present and scrubbed for the entire procedure.    Discharge Note    OUTCOME: Patient tolerated treatment/procedure well without complication and is now ready for discharge.    DISPOSITION: Home or Self Care    FINAL DIAGNOSIS:  Right hallux rigidus    FOLLOWUP: In clinic    DISCHARGE INSTRUCTIONS:    Discharge Procedure Orders   CAM walker for home use   Order Comments: for 7 days/weeks     Order Specific Question Answer Comments   Height: 6' (1.829 m)    Weight: 104 kg (229 lb 4.5 oz)    Quantity: 1    Size: L    Length of need (1-99 months): 1      CRUTCHES FOR HOME USE     Order Specific Question Answer Comments   Type: Axillary    Height: 6' (1.829 m)    Weight: 104 kg (229 lb 4.5 oz)    Length of need (1-99 months): 1      Diet general     Keep surgical extremity elevated     Ice to affected area     Sponge bath only until clinic visit     Leave dressing on - Keep it clean, dry,  and intact until clinic visit     Activity as tolerated

## 2025-02-19 NOTE — ANESTHESIA PREPROCEDURE EVALUATION
02/19/2025  Ricardo Castro is a 64 y.o., male presents as an outpatient for right toe arthroplasty.  Cleared by PCP.  Outside lab work and EKG reviewed    Last 3 sets of Vitals        2/17/2025     9:06 AM 2/19/2025     9:23 AM 2/19/2025     9:33 AM   Vitals - 1 value per visit   SYSTOLIC  156    DIASTOLIC  85    Pulse  71    Temp  36.7 °C (98 °F)    Resp   20   SPO2  99 %    Weight (lb) 225     Weight (kg) 102.059     Height 6' (1.829 m)     BMI (Calculated) 30.5         Pre-op Assessment    I have reviewed the Patient Summary Reports.    I have reviewed the NPO Status.   I have reviewed the Medications.     Review of Systems  Anesthesia Hx:               Denies Personal Hx of Anesthesia complications.                    Social:  Non-Smoker       Cardiovascular:     Hypertension                  Functional Capacity good / => 4 METS                         Hepatic/GI:         Taking GLP-1 Agonists  No Reported GI Symptoms          Endocrine:  Diabetes, type 2         Obesity / BMI > 30      Physical Exam  General: Well nourished, Cooperative, Alert and Oriented    Airway:  Mallampati: II   Mouth Opening: Normal  TM Distance: Normal  Tongue: Normal  Neck ROM: Normal ROM    Dental:  Intact    Chest/Lungs:  Clear to auscultation, Normal Respiratory Rate    Heart:  Rate: Normal  Rhythm: Regular Rhythm        Anesthesia Plan  Type of Anesthesia, risks & benefits discussed:    Anesthesia Type: Gen Supraglottic Airway  Intra-op Monitoring Plan: Standard ASA Monitors  Post Op Pain Control Plan: multimodal analgesia and IV/PO Opioids PRN  Induction:  IV  Airway Plan: Direct  Informed Consent: Informed consent signed with the Patient and all parties understand the risks and agree with anesthesia plan.  All questions answered.   ASA Score: 2  Day of Surgery Review of History & Physical: H&P Update referred to the  surgeon/provider.I have interviewed and examined the patient. I have reviewed the patient's H&P dated: There are no significant changes.   Anesthesia Plan Notes: Ankle block with IV sedation if surgeon is comfortable otherwise general anesthesia with LMA    Ready For Surgery From Anesthesia Perspective.     .

## 2025-02-19 NOTE — DISCHARGE SUMMARY
[facility] Surgical - Periop Services  Discharge Note  Short Stay    Procedure(s) (LRB): Right Tabares arthroplasty      OUTCOME: Patient tolerated treatment/procedure well without complication and is now ready for discharge.    DISPOSITION: Home or Self Care    FINAL DIAGNOSIS:  Right hallux rigidus    FOLLOWUP: In clinic    DISCHARGE INSTRUCTIONS:    Discharge Procedure Orders   CAM walker for home use   Order Comments: for 7 days/weeks     Order Specific Question Answer Comments   Height: 6' (1.829 m)    Weight: 104 kg (229 lb 4.5 oz)    Quantity: 1    Size: L    Length of need (1-99 months): 1      CRUTCHES FOR HOME USE     Order Specific Question Answer Comments   Type: Axillary    Height: 6' (1.829 m)    Weight: 104 kg (229 lb 4.5 oz)    Length of need (1-99 months): 1      Diet general     Keep surgical extremity elevated     Ice to affected area     Sponge bath only until clinic visit     Leave dressing on - Keep it clean, dry, and intact until clinic visit     Activity as tolerated        TIME SPENT ON DISCHARGE: 10 minutes

## 2025-02-20 VITALS
SYSTOLIC BLOOD PRESSURE: 159 MMHG | DIASTOLIC BLOOD PRESSURE: 89 MMHG | TEMPERATURE: 97 F | HEART RATE: 79 BPM | RESPIRATION RATE: 15 BRPM | HEIGHT: 72 IN | BODY MASS INDEX: 31.05 KG/M2 | OXYGEN SATURATION: 100 % | WEIGHT: 229.25 LBS

## 2025-02-20 NOTE — ANESTHESIA POSTPROCEDURE EVALUATION
Anesthesia Post Evaluation    Patient: Ricardo Castro    Procedure(s) Performed: Procedure(s) (LRB):  ARTHROPLASTY, TOE // Right Tabares Arthroplasty / surgery deposit 733.73 (Right)    Final Anesthesia Type: general      Patient location during evaluation: floor  Patient participation: Yes- Able to Participate  Level of consciousness: awake and alert  Post-procedure vital signs: reviewed and stable  Pain management: adequate  Airway patency: patent    PONV status at discharge: No PONV  Anesthetic complications: no      Cardiovascular status: blood pressure returned to baseline  Respiratory status: spontaneous ventilation and room air  Hydration status: euvolemic  Follow-up not needed.              Vitals Value Taken Time   /89 02/19/25 14:40   Temp 36.3 °C (97.3 °F) 02/19/25 12:50   Pulse 79 02/19/25 14:40   Resp 14 02/19/25 13:51   SpO2 100 % 02/19/25 14:40   Vitals shown include unfiled device data.      Event Time   Out of Recovery 13:55:00         Pain/Anita Score: Anita Score: 10 (2/19/2025  3:00 PM)

## 2025-02-21 LAB — POCT GLUCOSE: 173 MG/DL (ref 70–110)

## (undated) DEVICE — PACK  BASIC ORTHO LAFAYETTE

## (undated) DEVICE — SOL NACL IRR 1000ML BTL

## (undated) DEVICE — ADAPTER DUAL NSL LUER M-M 7FT

## (undated) DEVICE — CARTRIDGE SHOOTER

## (undated) DEVICE — GLOVE SENSICARE PI GRN 6.5

## (undated) DEVICE — DRAPE C-ARM MINI 54X78IN

## (undated) DEVICE — SUT 2-0 VICRYL FS-1 UND

## (undated) DEVICE — PACK EYE SWAN DISPOSABLE

## (undated) DEVICE — BLADE SURG STAINLESS STEEL #15

## (undated) DEVICE — NDL SYR 10ML 18X1.5 LL BLUNT

## (undated) DEVICE — BLANKET WARMING UPPER BODY

## (undated) DEVICE — ELECTRODE PATIENT RETURN DISP

## (undated) DEVICE — BLADE AGGR TOOTH MED 25X9

## (undated) DEVICE — GLOVE SIGNATURE MICRO LTX 6

## (undated) DEVICE — SUT ETHILON 4-0 PS4 18 BLK

## (undated) DEVICE — NDL HYPO REG 25G X 1 1/2

## (undated) DEVICE — GLOVE SIGNATURE MICRO LTX 7.5

## (undated) DEVICE — Device

## (undated) DEVICE — SUT MCRYL PLUS 4-0 PS2 27IN

## (undated) DEVICE — GLOVE SENSICARE PI MICRO 7

## (undated) DEVICE — SYR 3ML LL 18GA 1.5IN

## (undated) DEVICE — COVER PROXIMA MAYO STAND

## (undated) DEVICE — TOURNIQUET DB QC DP 18X5.5IN

## (undated) DEVICE — PAD ABDOMINAL STERILE 5X9IN

## (undated) DEVICE — SUT MCRYL PLUS 3-0 PS2 27IN

## (undated) DEVICE — SYR W NDL BLN FILL 5ML 18GX1.5

## (undated) DEVICE — GOWN POLY REINF BRTH SLV XL

## (undated) DEVICE — PACK CASSETTE TUBE ELLIPS FX

## (undated) DEVICE — SPONGE COTTON TRAY 4X4IN

## (undated) DEVICE — BANDAGE ESMARK ELASTIC ST 4X9

## (undated) DEVICE — BANDAGE MATRIX HK LOOP 4IN 5YD

## (undated) DEVICE — STRIP MEDI WND CLSR 1/2X4IN

## (undated) DEVICE — PACK ELITE MINIVIEW DRAPE

## (undated) DEVICE — TRAY SKIN SCRUB WET PREMIUM

## (undated) DEVICE — BNDG COFLEX FOAM LF2 ST 3X5YD

## (undated) DEVICE — DRAPE EXTREMITY ORTHOMAX

## (undated) DEVICE — GAUZE BANDAGE CONFORM 3X75IN

## (undated) DEVICE — DRESSING N ADH OIL EMUL 3X3

## (undated) DEVICE — TIP INTREPID I/A STR .03MM

## (undated) DEVICE — TIP PHACO 30 DEG BEVEL 20GA

## (undated) DEVICE — GLOVE SENSICARE PI SURG 7.5

## (undated) DEVICE — SUT BLK MONO 3-0 CUT 18IN F

## (undated) DEVICE — GLOVE SENSICARE PI SURG 6.5